# Patient Record
Sex: FEMALE | Race: WHITE | Employment: OTHER | URBAN - METROPOLITAN AREA
[De-identification: names, ages, dates, MRNs, and addresses within clinical notes are randomized per-mention and may not be internally consistent; named-entity substitution may affect disease eponyms.]

---

## 2017-02-14 DIAGNOSIS — E78.5 HYPERLIPIDEMIA LDL GOAL <100: ICD-10-CM

## 2017-02-14 DIAGNOSIS — E11.42 TYPE 2 DIABETES MELLITUS WITH DIABETIC POLYNEUROPATHY (H): ICD-10-CM

## 2017-02-14 LAB
ALBUMIN SERPL-MCNC: 3.6 G/DL (ref 3.4–5)
ALP SERPL-CCNC: 70 U/L (ref 40–150)
ALT SERPL W P-5'-P-CCNC: 35 U/L (ref 0–50)
AST SERPL W P-5'-P-CCNC: 16 U/L (ref 0–45)
BILIRUB DIRECT SERPL-MCNC: <0.1 MG/DL (ref 0–0.2)
BILIRUB SERPL-MCNC: 0.3 MG/DL (ref 0.2–1.3)
CHOLEST SERPL-MCNC: 175 MG/DL
CK SERPL-CCNC: 35 U/L (ref 30–225)
HBA1C MFR BLD: 7.3 % (ref 4.3–6)
HDLC SERPL-MCNC: 58 MG/DL
LDLC SERPL CALC-MCNC: 96 MG/DL
NONHDLC SERPL-MCNC: 117 MG/DL
PROT SERPL-MCNC: 6.9 G/DL (ref 6.8–8.8)
TRIGL SERPL-MCNC: 103 MG/DL
TSH SERPL DL<=0.005 MIU/L-ACNC: 1.69 MU/L (ref 0.4–4)

## 2017-02-14 PROCEDURE — 84443 ASSAY THYROID STIM HORMONE: CPT | Performed by: INTERNAL MEDICINE

## 2017-02-14 PROCEDURE — 80076 HEPATIC FUNCTION PANEL: CPT | Performed by: INTERNAL MEDICINE

## 2017-02-14 PROCEDURE — 82550 ASSAY OF CK (CPK): CPT | Performed by: INTERNAL MEDICINE

## 2017-02-14 PROCEDURE — 80061 LIPID PANEL: CPT | Performed by: INTERNAL MEDICINE

## 2017-02-14 PROCEDURE — 83036 HEMOGLOBIN GLYCOSYLATED A1C: CPT | Performed by: INTERNAL MEDICINE

## 2017-02-14 PROCEDURE — 36415 COLL VENOUS BLD VENIPUNCTURE: CPT | Performed by: INTERNAL MEDICINE

## 2017-02-21 ENCOUNTER — OFFICE VISIT (OUTPATIENT)
Dept: INTERNAL MEDICINE | Facility: CLINIC | Age: 82
End: 2017-02-21
Payer: COMMERCIAL

## 2017-02-21 DIAGNOSIS — R60.9 EDEMA, UNSPECIFIED TYPE: ICD-10-CM

## 2017-02-21 DIAGNOSIS — M81.0 OSTEOPOROSIS: ICD-10-CM

## 2017-02-21 DIAGNOSIS — I10 ESSENTIAL HYPERTENSION, BENIGN: ICD-10-CM

## 2017-02-21 DIAGNOSIS — E78.5 HYPERLIPIDEMIA LDL GOAL <100: ICD-10-CM

## 2017-02-21 DIAGNOSIS — Z00.01 ENCOUNTER FOR ROUTINE ADULT HEALTH EXAMINATION WITH ABNORMAL FINDINGS: Primary | ICD-10-CM

## 2017-02-21 DIAGNOSIS — E11.42 TYPE 2 DIABETES MELLITUS WITH DIABETIC POLYNEUROPATHY, WITHOUT LONG-TERM CURRENT USE OF INSULIN (H): ICD-10-CM

## 2017-02-21 PROCEDURE — 99397 PER PM REEVAL EST PAT 65+ YR: CPT | Performed by: INTERNAL MEDICINE

## 2017-02-21 PROCEDURE — 99207 C FOOT EXAM  NO CHARGE: CPT | Mod: 25 | Performed by: INTERNAL MEDICINE

## 2017-02-21 NOTE — PROGRESS NOTES
SUBJECTIVE:     CC: Tess Sellers is an 81 year old woman who presents for preventive health visit.     Healthy Habits:    Do you get at least three servings of calcium containing foods daily (dairy, green leafy vegetables, etc.)? yes    Amount of exercise or daily activities, outside of work: 4 day(s) per week    Problems taking medications regularly No    Medication side effects: No    Have you had an eye exam in the past two years? yes    Do you see a dentist twice per year? yes    Do you have sleep apnea, excessive snoring or daytime drowsiness?no            Today's PHQ-2 Score:   PHQ-2 ( 1999 [employer]) 2/21/2017 8/25/2016   Q1: Little interest or pleasure in doing things 0 0   Q2: Feeling down, depressed or hopeless 0 0   PHQ-2 Score 0 0       Abuse: Current or Past(Physical, Sexual or Emotional)- No  Do you feel safe in your environment - Yes    Social History   Substance Use Topics     Smoking status: Never Smoker     Smokeless tobacco: Never Used     Alcohol use No     The patient does not drink >3 drinks per day nor >7 drinks per week.    Recent Labs   Lab Test  02/14/17   0921  11/16/16   0925   09/14/15   0915  10/28/14   0949   CHOL  175  198   < >  171  182   HDL  58  66   < >  62  71   LDL  96  107*   < >  86  84   TRIG  103  125   < >  113  135   CHOLHDLRATIO   --    --    --   2.8  2.6   NHDL  117  132*   < >   --    --     < > = values in this interval not displayed.       Reviewed orders with patient.  Reviewed health maintenance and updated orders accordingly - Yes    Mammo Decision Support:  Patient over age 75, has elected to continue with mammography screening.    Pertinent mammograms are reviewed under the imaging tab.  History of abnormal Pap smear: NO - age 65 - see link Cervical Cytology Screening Guidelines  All Histories reviewed and updated in Epic.      ROS:  C: NEGATIVE for fever, chills, change in weight  I: NEGATIVE for worrisome rashes, moles or lesions  E: NEGATIVE for   "Irritation. Had eye exam Nov 2016. Has glasses. Some acuity change and  was told could get change Rx but has not done so yet  ENT: NEGATIVE for ear, mouth and throat problems.   R: NEGATIVE for significant cough or SOB  B: NEGATIVE for masses, tenderness or discharge  CV: NEGATIVE for chest pain, palpitations or peripheral edema  GI: NEGATIVE for nausea, abdominal pain, heartburn. Rare loose stools with certain meds. Hx partial  colectomy  : NEGATIVE for unusual urinary or vaginal symptoms. No vaginal bleeding.  M: NEGATIVE for significant arthralgias or myalgia  N: NEGATIVE for weakness, dizziness or paresthesias  P: NEGATIVE for changes in mood or affect    E: DM  and lipids as below:    Lab Results   Component Value Date     08/19/2016     Lab Results   Component Value Date    A1C 7.3 02/14/2017     Lab Results   Component Value Date    CHOL 175 02/14/2017     Lab Results   Component Value Date    LDL 96 02/14/2017     Lab Results   Component Value Date    HDL 58 02/14/2017     Lab Results   Component Value Date    TRIG 103 02/14/2017     Lab Results   Component Value Date    CR 0.81 08/19/2016     Lab Results   Component Value Date    ALT 35 02/14/2017     Lab Results   Component Value Date    AST 16 02/14/2017     Lab Results   Component Value Date    MICROL 8 08/19/2016     Lab Results   Component Value Date    TSH 1.69 02/14/2017         Problem list, Medication list, Allergies, and Medical/Social/Surgical histories reviewed in [facility] and updated as appropriate.  OBJECTIVE:     /78  Pulse 83  Temp 97.8  F (36.6  C) (Oral)  Ht 4' 11\" (1.499 m)  Wt 127 lb (57.6 kg)  LMP 10/03/1969  SpO2 98%  Breastfeeding? No  BMI 25.65 kg/m2  EXAM:  General appearance - healthy, alert, no distress  Skin - No rashes or lesions.  Head - normocephalic, atraumatic  Eyes - BRODY, EOMI, fundi exam with nondilated pupils negative.  Ears - External ears normal. Canals clear. TM's normal.  Nose/Sinuses - Nares normal. " Septum midline. Mucosa normal. No drainage or sinus tenderness.  Oropharynx - No erythema, no adenopathy, no exudates.  Neck - Supple without adenopathy or thyromegaly. No bruits.  Lungs - Clear to auscultation without wheezes/rhonchi.  Heart - Regular rate and rhythm without murmurs, clicks, or gallops.  Nodes - No supraclavicular, axillary, or inguinal adenopathy palpable.  Breasts - deferred  Abdomen - Abdomen soft, non-tender. BS normal. No masses or hepatosplenomegaly palpable. No bruits.  Extremities -No cyanosis, clubbing. Minimal BLE edema.    Musculoskeletal - Spine ROM normal. Muscular strength intact.  Left 1st MTP bunion without tenderness  Peripheral pulses - radial=4/4, femoral=4/4, posterior tibial=4/4, dorsalis pedis=4/4,  Neuro - Gait normal. Reflexes normal and symmetric. Sensation   Genital/Rectal - deferred      ASSESSMENT/PLAN:     1. Encounter for routine adult health examination with abnormal findings  HCM UTD.  Patient declines flu shots after previous reaction    2. Type 2 diabetes mellitus with diabetic polyneuropathy, without long-term current use of insulin (H)   diabetic control at goal given age. Continue current medications and repeat labs in six months  - FOOT EXAM  NO CHARGE [47093.114]  - glipiZIDE (GLUCOTROL) 5 MG tablet; Take 1 tablet (5 mg) by mouth 2 times daily (before meals)  Dispense: 180 tablet; Refill: 3  - metFORMIN (GLUCOPHAGE) 1000 MG tablet; 1 tab twice a day  Dispense: 180 tablet; Refill: 3  - blood glucose monitoring (NO BRAND SPECIFIED) test strip; Check blood sugar daily  Dispense: 100 strip; Refill: 3  - Hemoglobin A1c; Future  - Basic metabolic panel; Future  - Hemoglobin A1c; Future  - Comprehensive metabolic panel; Future  - Lipid panel reflex to direct LDL; Future  - Albumin Random Urine Quantitative; Future    3. Edema, unspecified type   stable. Continue current medication. Future labs ordered  - furosemide (LASIX) 20 MG tablet; One tablet 3 times per week  "(Monday, Wednesday, Friday)  Dispense: 90 tablet; Refill: 1  - Basic metabolic panel; Future  - Comprehensive metabolic panel; Future    4. BENIGN HYPERTENSION   stable. Continue current medication. Future labs ordered  - losartan (COZAAR) 100 MG tablet; Take 1 tablet (100 mg) by mouth daily  Dispense: 90 tablet; Refill: 3  - Comprehensive metabolic panel; Future  - Lipid panel reflex to direct LDL; Future  - Albumin Random Urine Quantitative; Future    5. Hyperlipidemia LDL goal <100   stable. Continue current medication. Future labs ordered  - lovastatin (MEVACOR) 40 MG tablet; Take 1 tablet (40 mg) by mouth At Bedtime  Dispense: 90 tablet; Refill: 3  - Comprehensive metabolic panel; Future  - Lipid panel reflex to direct LDL; Future    6. Osteoporosis   repeat  DEXA in 1 year.  Currently on drug holiday. See snapshot for details      COUNSELING:   Reviewed preventive health counseling, as reflected in patient instructions         reports that she has never smoked. She has never used smokeless tobacco.    Estimated body mass index is 25.65 kg/(m^2) as calculated from the following:    Height as of this encounter: 4' 11\" (1.499 m).    Weight as of this encounter: 127 lb (57.6 kg).       Counseling Resources:  ATP IV Guidelines  Pooled Cohorts Equation Calculator  Breast Cancer Risk Calculator  FRAX Risk Assessment  ICSI Preventive Guidelines  Dietary Guidelines for Americans, 2010  USDA's MyPlate  ASA Prophylaxis  Lung CA Screening    Venkatesh Cardenas MD  [facility]  "

## 2017-02-21 NOTE — MR AVS SNAPSHOT
After Visit Summary   2/21/2017    Tess Sellers    MRN: [id number]           Patient Information     Date Of Birth          1935        Visit Information        Provider Department      2/21/2017 11:30 AM Venkatesh Cardenas MD [facility]        Today's Diagnoses     Encounter for routine adult health examination with abnormal findings    -  1    Type 2 diabetes mellitus with diabetic polyneuropathy, without long-term current use of insulin (H)        Edema, unspecified type        BENIGN HYPERTENSION        Hyperlipidemia LDL goal <100        Osteoporosis          Care Instructions      Preventive Health Recommendations  Female Ages 65 +    Yearly exam:     See your health care provider every year in order to  o Review health changes.   o Discuss preventive care.    o Review your medicines if your doctor has prescribed any.      You no longer need a yearly Pap test unless you've had an abnormal Pap test in the past 10 years. If you have vaginal symptoms, such as bleeding or discharge, be sure to talk with your provider about a Pap test.      Every 1 to 2 years, have a mammogram.  If you are over 69, talk with your health care provider about whether or not you want to continue having screening mammograms.      Every 10 years, have a colonoscopy. Or, have a yearly FIT test (stool test). These exams will check for colon cancer.       Have a cholesterol test every 5 years, or more often if your doctor advises it.       Have a diabetes test (fasting glucose) every three years. If you are at risk for diabetes, you should have this test more often.       At age 65, have a bone density scan (DEXA) to check for osteoporosis (brittle bone disease).    Shots:    Get a flu shot each year.    Get a tetanus shot every 10 years.    Talk to your doctor about your pneumonia vaccines. There are now two you should receive - Pneumovax (PPSV 23) and Prevnar (PCV 13).    Talk to your doctor about  the shingles vaccine.    Talk to your doctor about the hepatitis B vaccine.    Nutrition:     Eat at least 5 servings of fruits and vegetables each day.      Eat whole-grain bread, whole-wheat pasta and brown rice instead of white grains and rice.      Talk to your provider about Calcium and Vitamin D.     Lifestyle    Exercise at least 150 minutes a week (30 minutes a day, 5 days a week). This will help you control your weight and prevent disease.      Limit alcohol to one drink per day.      No smoking.       Wear sunscreen to prevent skin cancer.       See your dentist twice a year for an exam and cleaning.      See your eye doctor every 1 to 2 years to screen for conditions such as glaucoma, macular degeneration, cataracts, etc         Follow-ups after your visit        Future tests that were ordered for you today     Open Future Orders        Priority Expected Expires Ordered    Hemoglobin A1c Routine 1/23/2018 2/22/2018 2/22/2017    Comprehensive metabolic panel Routine 1/23/2018 2/22/2018 2/22/2017    Lipid panel reflex to direct LDL Routine 1/23/2018 2/22/2018 2/22/2017    Albumin Random Urine Quantitative Routine 1/23/2018 2/22/2018 2/22/2017    Hemoglobin A1c Routine 8/21/2017 12/19/2017 2/22/2017    Basic metabolic panel Routine 8/21/2017 12/19/2017 2/22/2017            Who to contact     If you have questions or need follow up information about today's clinic visit or your schedule please contact [facility] directly at [phone].  Normal or non-critical lab and imaging results will be communicated to you by MyChart, letter or phone within 4 business days after the clinic has received the results. If you do not hear from us within 7 days, please contact the clinic through MyChart or phone. If you have a critical or abnormal lab result, we will notify you by phone as soon as possible.  Submit refill requests through [employer] or call your pharmacy and they will forward the refill  "request to us. Please allow 3 business days for your refill to be completed.          Additional Information About Your Visit        [employer] Information     [employer] lets you send messages to your doctor, view your test results, renew your prescriptions, schedule appointments and more. To sign up, go to [email or web address] . Click on \"Log in\" on the left side of the screen, which will take you to the Welcome page. Then click on \"Sign up Now\" on the right side of the page.     You will be asked to enter the access code listed below, as well as some personal information. Please follow the directions to create your username and password.     Your access code is: [email or web address]  Expires: 2017 10:35 PM     Your access code will  in 90 days. If you need help or a new code, please call your [place] clinic or [phone].        Care EveryWhere ID     This is your Care EveryWhere ID. This could be used by other organizations to access your [place] medical records  [id number]        Your Vitals Were     Pulse Temperature Height Last Period Pulse Oximetry Breastfeeding?    83 97.8  F (36.6  C) (Oral) 4' 11\" (1.499 m) 10/03/1969 98% No    BMI (Body Mass Index)                   25.65 kg/m2            Blood Pressure from Last 3 Encounters:   17 136/78   16 137/76   16 136/80    Weight from Last 3 Encounters:   17 127 lb (57.6 kg)   16 124 lb (56.2 kg)   16 128 lb (58.1 kg)              We Performed the Following     FOOT EXAM  NO CHARGE [23455.114]          Today's Medication Changes          These changes are accurate as of: 17 11:59 PM.  If you have any questions, ask your nurse or doctor.               These medicines have changed or have updated prescriptions.        Dose/Directions    blood glucose monitoring test strip   Commonly known as:  no brand specified   This may have changed:  additional instructions   Used for:  Type 2 diabetes mellitus with diabetic " polyneuropathy, without long-term current use of insulin (H)   Changed by:  Venkatesh Cardenas MD        Check blood sugar daily   Quantity:  100 strip   Refills:  3            Where to get your medicines      These medications were sent to [id number] - [place], [place] - [address]  [address], [facility] [address]     Phone:  [phone]     blood glucose monitoring test strip    furosemide 20 MG tablet    glipiZIDE 5 MG tablet    losartan 100 MG tablet    lovastatin 40 MG tablet    metFORMIN 1000 MG tablet                Primary Care Provider Office Phone # Fax #    Venkatesh Cardenas [phone]       [facility] [address]  [facility] [address]        Thank you!     Thank you for choosing [facility]  for your care. Our goal is always to provide you with excellent care. Hearing back from our patients is one way we can continue to improve our services. Please take a few minutes to complete the written survey that you may receive in the mail after your visit with us. Thank you!             Your Updated Medication List - Protect others around you: Learn how to safely use, store and throw away your medicines at www.disposemymeds.org.          This list is accurate as of: 2/21/17 11:59 PM.  Always use your most recent med list.                   Brand Name Dispense Instructions for use    acetaminophen 325 MG tablet    TYLENOL    100 tablet    Take 2 tablets (650 mg) by mouth every 4 hours as needed for other (mild pain)       ASPIRIN NOT PRESCRIBED    INTENTIONAL    0    due to dyspepsia, reflux       blood glucose monitoring test strip    no brand specified    100 strip    Check blood sugar daily       EPINEPHrine 0.3 MG/0.3ML injection    EPIPEN 2-EFE    2 each    Inject 0.3 mLs (0.3 mg) into the muscle once as needed for anaphylaxis       furosemide 20 MG tablet    LASIX    90 tablet    One tablet 3 times per week (Monday, Wednesday, Friday)        glipiZIDE 5 MG tablet    GLUCOTROL    180 tablet    Take 1 tablet (5 mg) by mouth 2 times daily (before meals)       losartan 100 MG tablet    COZAAR    90 tablet    Take 1 tablet (100 mg) by mouth daily       lovastatin 40 MG tablet    MEVACOR    90 tablet    Take 1 tablet (40 mg) by mouth At Bedtime       metFORMIN 1000 MG tablet    GLUCOPHAGE    180 tablet    1 tab twice a day       Multi-vitamin Tabs tablet   Generic drug:  multivitamin, therapeutic with minerals     0    1 TABLET DAILY       pyridoxine 100 MG tablet    VITAMIN B-6    0    1 x daily

## 2017-02-21 NOTE — NURSING NOTE
"Chief Complaint   Patient presents with     Physical       Initial /86  Pulse 83  Temp 97.8  F (36.6  C) (Oral)  Ht 4' 11\" (1.499 m)  Wt 127 lb (57.6 kg)  LMP 10/03/1969  SpO2 98%  Breastfeeding? No  BMI 25.65 kg/m2 Estimated body mass index is 25.65 kg/(m^2) as calculated from the following:    Height as of this encounter: 4' 11\" (1.499 m).    Weight as of this encounter: 127 lb (57.6 kg).  Medication Reconciliation: complete    "

## 2017-02-21 NOTE — LETTER
[address] [address]  [phone]        January 29, 2018    Tess Sellers  [address] [facility] [address]              Dear Tess Sellers    This is to remind you that your fasting labs and a urine specimen is due.    You may call our office at [phone] to schedule an appointment.    Please disregard this notice if you have already had your labs drawn or made an appointment.        Sincerely,        Venkatesh Cardenas MD

## 2017-02-22 VITALS
HEART RATE: 83 BPM | BODY MASS INDEX: 25.6 KG/M2 | DIASTOLIC BLOOD PRESSURE: 78 MMHG | HEIGHT: 59 IN | SYSTOLIC BLOOD PRESSURE: 136 MMHG | TEMPERATURE: 97.8 F | WEIGHT: 127 LBS | OXYGEN SATURATION: 98 %

## 2017-02-22 PROBLEM — E11.42 TYPE 2 DIABETES MELLITUS WITH DIABETIC POLYNEUROPATHY, WITHOUT LONG-TERM CURRENT USE OF INSULIN (H): Status: ACTIVE | Noted: 2017-02-22

## 2017-02-22 RX ORDER — LOVASTATIN 40 MG
40 TABLET ORAL AT BEDTIME
Qty: 90 TABLET | Refills: 3 | Status: SHIPPED | OUTPATIENT
Start: 2017-02-22 | End: 2017-11-09

## 2017-02-22 RX ORDER — LOSARTAN POTASSIUM 100 MG/1
100 TABLET ORAL DAILY
Qty: 90 TABLET | Refills: 3 | Status: SHIPPED | OUTPATIENT
Start: 2017-02-22 | End: 2018-03-04

## 2017-02-22 RX ORDER — GLIPIZIDE 5 MG/1
5 TABLET ORAL
Qty: 180 TABLET | Refills: 3 | Status: SHIPPED | OUTPATIENT
Start: 2017-02-22 | End: 2018-03-27

## 2017-02-22 RX ORDER — FUROSEMIDE 20 MG
TABLET ORAL
Qty: 90 TABLET | Refills: 1 | Status: SHIPPED | OUTPATIENT
Start: 2017-02-22 | End: 2018-03-25

## 2017-07-14 ENCOUNTER — HOSPITAL ENCOUNTER (EMERGENCY)
Facility: CLINIC | Age: 82
Discharge: HOME | End: 2017-07-14
Attending: EMERGENCY MEDICINE | Admitting: EMERGENCY MEDICINE
Payer: COMMERCIAL

## 2017-07-14 ENCOUNTER — APPOINTMENT (OUTPATIENT)
Dept: ULTRASOUND IMAGING | Facility: CLINIC | Age: 82
End: 2017-07-14
Attending: EMERGENCY MEDICINE
Payer: COMMERCIAL

## 2017-07-14 ENCOUNTER — TELEPHONE (OUTPATIENT)
Dept: NURSING | Facility: CLINIC | Age: 82
End: 2017-07-14

## 2017-07-14 VITALS
TEMPERATURE: 97.8 F | RESPIRATION RATE: 16 BRPM | DIASTOLIC BLOOD PRESSURE: 84 MMHG | OXYGEN SATURATION: 97 % | HEART RATE: 93 BPM | SYSTOLIC BLOOD PRESSURE: 154 MMHG

## 2017-07-14 DIAGNOSIS — M79.662 PAIN OF LEFT LOWER LEG: ICD-10-CM

## 2017-07-14 DIAGNOSIS — M54.32 SCIATICA OF LEFT SIDE: ICD-10-CM

## 2017-07-14 LAB
BASOPHILS # BLD AUTO: 0 10E9/L (ref 0–0.2)
BASOPHILS NFR BLD AUTO: 0.2 %
DIFFERENTIAL METHOD BLD: NORMAL
EOSINOPHIL # BLD AUTO: 0.2 10E9/L (ref 0–0.7)
EOSINOPHIL NFR BLD AUTO: 3.4 %
ERYTHROCYTE [DISTWIDTH] IN BLOOD BY AUTOMATED COUNT: 12.7 % (ref 10–15)
HCT VFR BLD AUTO: 37.8 % (ref 35–47)
HGB BLD-MCNC: 13 G/DL (ref 11.7–15.7)
IMM GRANULOCYTES # BLD: 0 10E9/L (ref 0–0.4)
IMM GRANULOCYTES NFR BLD: 0.2 %
LYMPHOCYTES # BLD AUTO: 1.6 10E9/L (ref 0.8–5.3)
LYMPHOCYTES NFR BLD AUTO: 35.8 %
MCH RBC QN AUTO: 30 PG (ref 26.5–33)
MCHC RBC AUTO-ENTMCNC: 34.4 G/DL (ref 31.5–36.5)
MCV RBC AUTO: 87 FL (ref 78–100)
MONOCYTES # BLD AUTO: 0.5 10E9/L (ref 0–1.3)
MONOCYTES NFR BLD AUTO: 11.3 %
NEUTROPHILS # BLD AUTO: 2.2 10E9/L (ref 1.6–8.3)
NEUTROPHILS NFR BLD AUTO: 49.1 %
NRBC # BLD AUTO: 0 10*3/UL
NRBC BLD AUTO-RTO: 0 /100
NT-PROBNP SERPL-MCNC: 160 PG/ML (ref 0–1800)
PLATELET # BLD AUTO: 252 10E9/L (ref 150–450)
RBC # BLD AUTO: 4.33 10E12/L (ref 3.8–5.2)
WBC # BLD AUTO: 4.4 10E9/L (ref 4–11)

## 2017-07-14 PROCEDURE — 36415 COLL VENOUS BLD VENIPUNCTURE: CPT | Performed by: EMERGENCY MEDICINE

## 2017-07-14 PROCEDURE — 83880 ASSAY OF NATRIURETIC PEPTIDE: CPT | Performed by: EMERGENCY MEDICINE

## 2017-07-14 PROCEDURE — 93970 EXTREMITY STUDY: CPT

## 2017-07-14 PROCEDURE — 85025 COMPLETE CBC W/AUTO DIFF WBC: CPT | Performed by: EMERGENCY MEDICINE

## 2017-07-14 PROCEDURE — 99284 EMERGENCY DEPT VISIT MOD MDM: CPT | Mod: 25

## 2017-07-14 ASSESSMENT — ENCOUNTER SYMPTOMS
BACK PAIN: 0
MYALGIAS: 1
SHORTNESS OF BREATH: 0

## 2017-07-14 NOTE — ED AVS SNAPSHOT
[facility] Emergency Department    [address]    [facility] [address]    Phone:  [phone]    Fax:  [phone]                                       Tess Sellers   MRN: [id number]    Department:  [facility] Emergency Department   Date of Visit:  7/14/2017           Patient Information     Date Of Birth          1935        Your diagnoses for this visit were:     Pain of left lower leg     Sciatica of left side        You were seen by Austin Sagastume MD.        Discharge Instructions       Please make an appointment to follow up with your primary care provider in 4-5 days if not improving.    Use Tylenol and/or Ibuprofen for pain or discomfort          Understanding Lumbar Radiculopathy    Lumbar radiculopathy is irritation or inflammation of a nerve root in the low back. It causes symptoms that spread out from the back down one or both legs. To understand this condition, it helps to understand the parts of the spine:    Vertebrae. These are bones that stack to form the spine. The lumbar spine contains the 5 bottom vertebrae.    Disks. These are soft pads of tissue between the vertebrae. They act as shock absorbers for the spine.    Spinal canal. This is a tunnel formed within the stacked vertebrae. In the lumbar spine, nerves run through this canal.    Nerves. These branch off and leave the spinal canal, traveling out to parts of the body. As they leave the spinal canal, nerves pass through openings between the vertebrae. The nerve root is the part of the nerve that is closest to the spinal canal.    Sciatic nerve. This is a large nerve formed from several nerve roots in the low back. This nerve extends down the back of the leg to the foot.  With lumbar radiculopathy, nerve roots in the low back become irritated. This leads to pain and symptoms. The sciatic nerve is commonly involved, so the condition is often called sciatica.  What causes lumbar  radiculopathy?  Aging, injury, poor posture, extra body weight, and other issues can lead to problems in the low back. These problems may then irritate nerve roots. They include:    Damage to a disk in the lumbar spine. The damaged disk may then press on nearby nerve roots.    Degeneration from wear and tear, and aging. This can lead to narrowing (stenosis) of the openings between the vertebrae. The narrowed openings press on nerve roots as they leave the spinal canal.    Unstable spine. This is when a vertebra slips forward. It can then press on a nerve root.  Other, less common things can put pressure on nerves in the low back. These include diabetes, infection, or a tumor.  Symptoms of lumbar radiculopathy  These include:    Pain in the low back    Pain, numbness, tingling, or weakness that travels into the buttocks, hip, groin, or leg    Muscle spasms  Treatment for lumbar radiculopathy  In most cases, your healthcare provider will first try treatments that help relieve symptoms. These may include:    Prescription and over-the-counter pain medicines. These help relieve pain, swelling, and irritation.    Limits on positions and activities that increase pain. But lying in bed or avoiding all movement is only recommended for a short period of time.    Physical therapy, including exercises and stretches. This helps decrease pain and increase movement and function.    Steroid shots into the lower back. This may help relieve symptoms for a time.    Weight-loss program. If you are overweight, losing extra pounds may help relieve symptoms.  In some cases, you may need surgery to fix the underlying problem. This depends on the cause, the symptoms, and how long the pain has lasted.  Possible complications  Over time, an irritated and inflamed nerve may become damaged. This may lead to long-lasting (permanent) numbness or weakness in your legs and feet. If symptoms change suddenly or get worse, be sure to let your  healthcare provider know.  When to call your healthcare provider  Call your healthcare provider right away if you have any of these:    New pain or pain that gets worse    New or increasing weakness, tingling, or numbness in your leg or foot    Problems controlling your bladder or bowel   Date Last Reviewed: 3/10/2016    [id number] [employer]. [address] [place] [address]. All rights reserved. This information is not intended as a substitute for professional medical care. Always follow your healthcare professional's instructions.            24 Hour Appointment Hotline       To make an appointment at any [facility], call [phone] ([phone]). If you don't have a family doctor or clinic, we will help you find one. [facility] clinics are conveniently located to serve the needs of you and your family.             Review of your medicines      Our records show that you are taking the medicines listed below. If these are incorrect, please call your family doctor or clinic.        Dose / Directions Last dose taken    acetaminophen 325 MG tablet   Commonly known as:  TYLENOL   Dose:  650 mg   Quantity:  100 tablet        Take 2 tablets (650 mg) by mouth every 4 hours as needed for other (mild pain)   Refills:  0        ASPIRIN NOT PRESCRIBED   Commonly known as:  INTENTIONAL   Quantity:  0        due to dyspepsia, reflux   Refills:  0        blood glucose monitoring test strip   Commonly known as:  no brand specified   Quantity:  100 strip        Check blood sugar daily   Refills:  3        EPINEPHrine 0.3 MG/0.3ML injection 2-pack   Commonly known as:  EPIPEN 2-EFE   Dose:  0.3 mg   Quantity:  2 each        Inject 0.3 mLs (0.3 mg) into the muscle once as needed for anaphylaxis   Refills:  1        furosemide 20 MG tablet   Commonly known as:  LASIX   Quantity:  90 tablet        One tablet 3 times per week (Monday, Wednesday, Friday)   Refills:  1        glipiZIDE 5 MG tablet    Commonly known as:  GLUCOTROL   Dose:  5 mg   Quantity:  180 tablet        Take 1 tablet (5 mg) by mouth 2 times daily (before meals)   Refills:  3        losartan 100 MG tablet   Commonly known as:  COZAAR   Dose:  100 mg   Quantity:  90 tablet        Take 1 tablet (100 mg) by mouth daily   Refills:  3        lovastatin 40 MG tablet   Commonly known as:  MEVACOR   Dose:  40 mg   Quantity:  90 tablet        Take 1 tablet (40 mg) by mouth At Bedtime   Refills:  3        metFORMIN 1000 MG tablet   Commonly known as:  GLUCOPHAGE   Quantity:  180 tablet        1 tab twice a day   Refills:  3        Multi-vitamin Tabs tablet   Quantity:  0   Generic drug:  multivitamin, therapeutic with minerals        1 TABLET DAILY   Refills:  0        pyridoxine 100 MG tablet   Commonly known as:  VITAMIN B-6   Quantity:  0        1 x daily   Refills:  0                Procedures and tests performed during your visit     BNP    CBC + differential    US Lower Extremity Venous Duplex Bilateral      Orders Needing Specimen Collection     None      Pending Results     No orders found from 7/12/2017 to 7/15/2017.            Pending Culture Results     No orders found from 7/12/2017 to 7/15/2017.            Pending Results Instructions     If you had any lab results that were not finalized at the time of your Discharge, you can call the ED Lab Result RN at [phone]. You will be contacted by this team for any positive Lab results or changes in treatment. The nurses are available 7 days a week from 10A to 6:30P.  You can leave a message 24 hours per day and they will return your call.        Test Results From Your Hospital Stay        7/14/2017  5:06 PM      Component Results     Component Value Ref Range & Units Status    N-Terminal Pro BNP Inpatient 160 0 - 1800 pg/mL Final    Reference range shown and results flagged as abnormal are suggested inpatient   cut points for confirming diagnosis if CHF in an acute setting. Establishing    a   baseline value for each individual patient is useful for follow-up. An   inpatient or emergency department NT-proPBNP <300 pg/mL effectively rules out   acute CHF, with 99% negative predictive value.  The outpatient non-acute reference range for ruling out CHF is:   0-125 pg/mL (age 18 to less than 75)   0-450 pg/mL (age 75 yrs and older)           7/14/2017  4:46 PM      Component Results     Component Value Ref Range & Units Status    WBC 4.4 4.0 - 11.0 10e9/L Final    RBC Count 4.33 3.8 - 5.2 10e12/L Final    Hemoglobin 13.0 11.7 - 15.7 g/dL Final    Hematocrit 37.8 35.0 - 47.0 % Final    MCV 87 78 - 100 fl Final    MCH 30.0 26.5 - 33.0 pg Final    MCHC 34.4 31.5 - 36.5 g/dL Final    RDW 12.7 10.0 - 15.0 % Final    Platelet Count 252 150 - 450 10e9/L Final    Diff Method Automated Method  Final    % Neutrophils 49.1 % Final    % Lymphocytes 35.8 % Final    % Monocytes 11.3 % Final    % Eosinophils 3.4 % Final    % Basophils 0.2 % Final    % Immature Granulocytes 0.2 % Final    Nucleated RBCs 0 0 /100 Final    Absolute Neutrophil 2.2 1.6 - 8.3 10e9/L Final    Absolute Lymphocytes 1.6 0.8 - 5.3 10e9/L Final    Absolute Monocytes 0.5 0.0 - 1.3 10e9/L Final    Absolute Eosinophils 0.2 0.0 - 0.7 10e9/L Final    Absolute Basophils 0.0 0.0 - 0.2 10e9/L Final    Abs Immature Granulocytes 0.0 0 - 0.4 10e9/L Final    Absolute Nucleated RBC 0.0  Final         7/14/2017  5:43 PM      Narrative     BILATERAL LOWER EXTREMITY VENOUS DOPPLER ULTRASOUND  7/14/2017 5:41 PM    HISTORY: Bilateral lower extremity pain and swelling. The concern is  for deep venous thrombosis.    COMPARISON: None.    FINDINGS: Color flow and Doppler spectral waveform analysis  demonstrates normal blood flow in the common femoral, femoral,  popliteal, posterior tibial, and greater saphenous veins of the lower  extremities bilaterally. No thrombus is seen.        Impression     IMPRESSION: There is no evidence of deep venous thrombosis within  the  lower extremities bilaterally.    JANESSA FLETCHER MD                Clinical Quality Measure: Blood Pressure Screening     Your blood pressure was checked while you were in the emergency department today. The last reading we obtained was  BP: 154/84 . Please read the guidelines below about what these numbers mean and what you should do about them.  If your systolic blood pressure (the top number) is less than 120 and your diastolic blood pressure (the bottom number) is less than 80, then your blood pressure is normal. There is nothing more that you need to do about it.  If your systolic blood pressure (the top number) is 120-139 or your diastolic blood pressure (the bottom number) is 80-89, your blood pressure may be higher than it should be. You should have your blood pressure rechecked within a year by a primary care provider.  If your systolic blood pressure (the top number) is 140 or greater or your diastolic blood pressure (the bottom number) is 90 or greater, you may have high blood pressure. High blood pressure is treatable, but if left untreated over time it can put you at risk for heart attack, stroke, or kidney failure. You should have your blood pressure rechecked by a primary care provider within the next 4 weeks.  If your provider in the emergency department today gave you specific instructions to follow-up with your doctor or provider even sooner than that, you should follow that instruction and not wait for up to 4 weeks for your follow-up visit.        Thank you for choosing [facility]       Thank you for choosing [facility] for your care. Our goal is always to provide you with excellent care. Hearing back from our patients is one way we can continue to improve our services. Please take a few minutes to complete the written survey that you may receive in the mail after you visit with us. Thank you!        [employer] Information     [employer] lets you send messages to your doctor, view your test  "results, renew your prescriptions, schedule appointments and more. To sign up, go to [email or web address] . Click on \"Log in\" on the left side of the screen, which will take you to the Welcome page. Then click on \"Sign up Now\" on the right side of the page.     You will be asked to enter the access code listed below, as well as some personal information. Please follow the directions to create your username and password.     Your access code is: [email or web address]  Expires: 10/12/2017  6:05 PM     Your access code will  in 90 days. If you need help or a new code, please call your [facility] clinic or [phone].        Care EveryWhere ID     This is your Care EveryWhere ID. This could be used by other organizations to access your [facility] medical records  [id number]        Equal Access to Services     RICKY MILLS : Ernie De La O, natalia stevens, jose gaona, yudi garcia . So [facility] [phone].    ATENCIÓN: Si habla español, tiene a hughes disposición servicios gratuitos de asistencia lingüística. Llame al [phone].    We comply with applicable federal civil rights laws and [place] laws. We do not discriminate on the basis of race, color, national origin, age, disability sex, sexual orientation or gender identity.            After Visit Summary       This is your record. Keep this with you and show to your community pharmacist(s) and doctor(s) at your next visit.                  "

## 2017-07-14 NOTE — DISCHARGE INSTRUCTIONS
Please make an appointment to follow up with your primary care provider in 4-5 days if not improving.    Use Tylenol and/or Ibuprofen for pain or discomfort          Understanding Lumbar Radiculopathy    Lumbar radiculopathy is irritation or inflammation of a nerve root in the low back. It causes symptoms that spread out from the back down one or both legs. To understand this condition, it helps to understand the parts of the spine:    Vertebrae. These are bones that stack to form the spine. The lumbar spine contains the 5 bottom vertebrae.    Disks. These are soft pads of tissue between the vertebrae. They act as shock absorbers for the spine.    Spinal canal. This is a tunnel formed within the stacked vertebrae. In the lumbar spine, nerves run through this canal.    Nerves. These branch off and leave the spinal canal, traveling out to parts of the body. As they leave the spinal canal, nerves pass through openings between the vertebrae. The nerve root is the part of the nerve that is closest to the spinal canal.    Sciatic nerve. This is a large nerve formed from several nerve roots in the low back. This nerve extends down the back of the leg to the foot.  With lumbar radiculopathy, nerve roots in the low back become irritated. This leads to pain and symptoms. The sciatic nerve is commonly involved, so the condition is often called sciatica.  What causes lumbar radiculopathy?  Aging, injury, poor posture, extra body weight, and other issues can lead to problems in the low back. These problems may then irritate nerve roots. They include:    Damage to a disk in the lumbar spine. The damaged disk may then press on nearby nerve roots.    Degeneration from wear and tear, and aging. This can lead to narrowing (stenosis) of the openings between the vertebrae. The narrowed openings press on nerve roots as they leave the spinal canal.    Unstable spine. This is when a vertebra slips forward. It can then press on a nerve  root.  Other, less common things can put pressure on nerves in the low back. These include diabetes, infection, or a tumor.  Symptoms of lumbar radiculopathy  These include:    Pain in the low back    Pain, numbness, tingling, or weakness that travels into the buttocks, hip, groin, or leg    Muscle spasms  Treatment for lumbar radiculopathy  In most cases, your healthcare provider will first try treatments that help relieve symptoms. These may include:    Prescription and over-the-counter pain medicines. These help relieve pain, swelling, and irritation.    Limits on positions and activities that increase pain. But lying in bed or avoiding all movement is only recommended for a short period of time.    Physical therapy, including exercises and stretches. This helps decrease pain and increase movement and function.    Steroid shots into the lower back. This may help relieve symptoms for a time.    Weight-loss program. If you are overweight, losing extra pounds may help relieve symptoms.  In some cases, you may need surgery to fix the underlying problem. This depends on the cause, the symptoms, and how long the pain has lasted.  Possible complications  Over time, an irritated and inflamed nerve may become damaged. This may lead to long-lasting (permanent) numbness or weakness in your legs and feet. If symptoms change suddenly or get worse, be sure to let your healthcare provider know.  When to call your healthcare provider  Call your healthcare provider right away if you have any of these:    New pain or pain that gets worse    New or increasing weakness, tingling, or numbness in your leg or foot    Problems controlling your bladder or bowel   Date Last Reviewed: 3/10/2016    4292-9547 [employer]. [address] [place] [address], [place] [address]. All rights reserved. This information is not intended as a substitute for professional medical care. Always follow your healthcare professional's  instructions.

## 2017-07-14 NOTE — ED AVS SNAPSHOT
[facility] Emergency Department    [address]    [facility] [address]    Phone:  [phone]    Fax:  [phone]                                       Tess Sellers   MRN: [id number]    Department:  [facility] Emergency Department   Date of Visit:  7/14/2017           After Visit Summary Signature Page     I have received my discharge instructions, and my questions have been answered. I have discussed any challenges I see with this plan with the nurse or doctor.    ..........................................................................................................................................  Patient/Patient Representative Signature      ..........................................................................................................................................  Patient Representative Print Name and Relationship to Patient    ..................................................               ................................................  Date                                            Time    ..........................................................................................................................................  Reviewed by Signature/Title    ...................................................              ..............................................  Date                                                            Time

## 2017-07-14 NOTE — TELEPHONE ENCOUNTER
"Tess Sellers is a 82 year old female who calls with L) leg pain.    NURSING ASSESSMENT:  Description:  L) \"terrible\" sharp/throbbing leg pain.  Pain at knee down to foot.    Onset/duration:  Onset - 4 days ago   Duration - constant   Precip. factors:  No recent injury or trauma   Associated symptoms:  L) leg swelling.  Discoloration - purple spot on L) anterior foot, onset one week ago.  Denies CP, SOB, fever, warmth, or redness.    Improves/worsens symptoms:  Improves with Tylenol .  Worsens with weight bearing and ambulation.     Pain scale (0-10)   10/10    Allergies:   Allergies   Allergen Reactions     Atenolol      fatigue     Atorvastatin Calcium      myalgias, fatigue     Captopril      cough     Clonidine      dry mouth     Diltiazem Hcl      Influenza Virus Vaccine H5n1      local reaction     Lisinopril      cough     Methyldopa      Naproxen      Norvasc [Amlodipine Besylate]      edema       Pravachol [Pravastatin Sodium]      myalgias     Spironolactone      Terazosin Hcl      edema, fatigue     Thiazide-Type Diuretics      Verapamil Hcl      dizzy       NURSING PLAN: Nursing advice to patient to seek emergency care.     RECOMMENDED DISPOSITION:  To ED, another person to drive - patient stated son will drive her to ER.  Will comply with recommendation: Yes  If further questions/concerns or if symptoms do not improve, worsen or new symptoms develop, call your PCP or [facility] as soon as possible.      Guideline used:  Telephone Triage Protocols for Nurses, Fifth Edition, Radha Alexander RN    "

## 2017-07-14 NOTE — ED NOTES
Blood sugar of 184 this morning at home. Patient does not use insulin. Notes this is a higher than normal blood sugar for her but she has been compliant with her medications. Patient is concerned for blood clot in the affected leg.

## 2017-07-14 NOTE — ED NOTES
Left leg pain for four days. Bilateral ankle swelling. Pt notes tingling to her feet, but states that it is from diabetes. Patient took a pain pill prior to arrival and denies any current pain. Denies chest pain or SOB. Denies recent injury or fall. ABCDs intact.

## 2017-07-14 NOTE — ED PROVIDER NOTES
"  History     Chief Complaint:  Left Leg Pain      HPI   Tess Sellers is a 82 year old female who presents to the emergency department today for evaluation of left leg pain. The patient reports intermittent pain that starts at the back of her left calf and radiates up her leg to her hip and butt when it is at its most painful. She denies any pain in her foot or ankle. She describes the pain as so severe that it \"goes into her bones\". The patient is a diabetic but is not on insulin and had a sugar of 184 this morning. She has leg swelling but this is not new. The patient denies back pain, shortness of breath, sciatic nerve issues, recent travel or history of DVT.    PE/DVT Risk Factors:   Hx of PE/DVT:                        Negative  Hx of clotting disorder:            Negative  Hx of cancer:                          Negative  Tobacco use:                          Negative  Hormone therapy:                   Negative  Pregnancy:                              Negative  Prolonged immobilization:       Negative  Recent surgery:                       Negative  Recent travel:                          Negative  Familial Hx of PE/DVT:           Negative    Allergies:  Atenolol  Atorvastatin Calcium  Captopril  Clonidine  Diltiazem Hcl  Influenza Virus Vaccine H5n1  Lisinopril  Methyldopa  Naproxen  Norvasc [Amlodipine Besylate]  Pravachol [Pravastatin Sodium]  Spironolactone  Terazosin Hcl  Thiazide-Type Diuretics  Verapamil Hcl      Medications:    furosemide (LASIX) 20 MG tablet  losartan (COZAAR) 100 MG tablet  glipiZIDE (GLUCOTROL) 5 MG tablet  metFORMIN (GLUCOPHAGE) 1000 MG tablet  blood glucose monitoring (NO BRAND SPECIFIED) test strip  lovastatin (MEVACOR) 40 MG tablet  EPINEPHrine (EPIPEN 2-EFE) 0.3 MG/0.3ML injection    Past Medical History:    Colon polyps   Diaphragmatic hernia without mention of obstruction or gangrene   DYSPEPSIA   Edema   Esophageal reflux   Essential hypertension, benign   Hyperlipidemia " LDL goal <100   Hypertrophy of breast   idiopathic cyclic edema   Mixed incontinence   Nonspecific reaction to tuberculin skin test without active tuberculosis   Osteoporosis, unspecified   Peripheral neuropathy (H)   Spinal stenosis, lumbar region, without neurogenic claudication   Type 2 diabetes mellitus with diabetic polyneuropathy (goal A1C<8)     Past Surgical History:    Hysterectomy  Status Post Hysterectomy  Appendectomy  Cholecystectomy  Colon polyps  Bilateral Breast Reduction  Right cholectomy  Hemorrhoidectomy  IOL OS  Blepharoplasty  Laser OS  DaVinci Sacrocolpoplexy, cystoscopy combined  Herniorrhaphy    Family History:    Colorectal cancer  Heart Disease  Lung cancer    Social History:  The patient was accompanied to the ED by her daughter.  Smoking Status: Never Smoker  Smokeless Tobacco: Never Used  Alcohol Use: No   Marital Status:  Single [1]    Review of Systems   Respiratory: Negative for shortness of breath.    Cardiovascular: Positive for leg swelling (baseline).   Musculoskeletal: Positive for myalgias (left calf, leg, butt). Negative for back pain. Joint swelling: left calf, thigh, butt.        Denies foot and ankle pain   All other systems reviewed and are negative.    Physical Exam   First Vitals:  BP: (!) 192/121  Pulse: 93  Temp: 97.8  F (36.6  C)  Resp: 16  SpO2: 96 %    Physical Exam    HEENT: mmm  Neck: supple  CV: ppi, regular   Resp: speaking in full sentences with any resp distress, ctab  Abd: soft,nt, nd  Back: No TTP midline C/T/L spine, no Paraspinous muscle TTP  Skin: warm, dry, well perused, no rashes/bruising/lesions on exposed skin. Mild bilateral pedal edema  Neuro: alert, follows commands, speech clear, strength 5/5 and symmetric, SILT in BUE  BLE   5/5 Strength Thigh Flex/Ext, Leg flex/Ext, PF/DF/EHL   SILT all 5 dermatomes BLE   Normal muscular tone            Positive straight leg raise on the left both passive and active in about 60 . This exactly reproduces her  symptoms   Gait stable    Psych: nomral mood and affect    Emergency Department Course     Imaging:  Radiology findings were communicated with the patient who voiced understanding of the findings.    US Lower Extremity Venous Duplex Bilateral  IMPRESSION: There is no evidence of deep venous thrombosis within the  lower extremities bilaterally.  Report per radiology      Laboratory:  Laboratory findings were communicated with the patient who voiced understanding of the findings.  CBC: AWNL. (WBC 4.4, HGB 13.0, )   BNP: 160     Emergency Department Course:  Nursing notes and vitals reviewed.  I performed an exam of the patient as documented above.   The patient was sent for a US Lower Extremity Venous Duplex Bilateral while in the emergency department, results above.   IV was inserted and blood was drawn for laboratory testing, results above.   1757: Patient rechecked and updated.    I discussed the treatment plan with the patient. They expressed understanding of this plan and consented to discharge. They will be discharged home with instructions for care and follow up. In addition, the patient will return to the emergency department if their symptoms persist, worsen, if new symptoms arise or if there is any concern.  All questions were answered.   I personally reviewed the laboratory and imaging results with the Patient and answered all related questions prior to discharge.    Impression & Plan      Medical Decision Making:  Tess Sellers is a 82 year old female here with intermittent left leg pain for the past 4 or 5 days. She has baseline bilateral lower extremity swelling. Work up here was unremarkable for DVT. There is no evidence of soft tissue cellulitis here. She does have pain reproducible with straight leg raise. She has no motor or sensory deficits. I suspect that this is more consistent with sciatica and radiculopathy. I do not think that this is consistent with claudication given her strong  pulses, no lack of perfusion, no pattern of exertional symptoms which would be classic for claudication.    Diagnosis:    ICD-10-CM    1. Pain of left lower leg M79.662    2. Sciatica of left side M54.32       Disposition:   Discharged to home     Scribe Disclosure:  I, Natalie Michael, am serving as a scribe at 4:18 PM on 7/14/2017 to document services personally performed by Austin Sagastume MD, based on my observations and the provider's statements to me.    7/14/2017   [facility] EMERGENCY DEPARTMENT       Austin Sagastume MD  07/14/17 7073

## 2017-07-17 ENCOUNTER — TELEPHONE (OUTPATIENT)
Dept: INTERNAL MEDICINE | Facility: CLINIC | Age: 82
End: 2017-07-17

## 2017-07-17 NOTE — TELEPHONE ENCOUNTER
Reason for call:  Same Day Appointment   Requested Provider: Dr. Cardenas    PCP: [unfilled]Dr. Cardenas    Reason for visit: ER follow up sciatic nerve      Have you been treated for this in the past? No    Additional comments: Patient is in a lot of pain and would like to be seen sooner then Friday      Phone number to reach patient:  Home number on file [phone] (home)    Best Time:  anytime    Can we leave a detailed message on this number?  YES

## 2017-07-18 ENCOUNTER — OFFICE VISIT (OUTPATIENT)
Dept: INTERNAL MEDICINE | Facility: CLINIC | Age: 82
End: 2017-07-18
Payer: COMMERCIAL

## 2017-07-18 VITALS
SYSTOLIC BLOOD PRESSURE: 134 MMHG | OXYGEN SATURATION: 97 % | WEIGHT: 125 LBS | HEART RATE: 83 BPM | DIASTOLIC BLOOD PRESSURE: 78 MMHG | TEMPERATURE: 98.2 F | BODY MASS INDEX: 25.25 KG/M2

## 2017-07-18 DIAGNOSIS — Z71.89 ADVANCED DIRECTIVES, COUNSELING/DISCUSSION: ICD-10-CM

## 2017-07-18 DIAGNOSIS — M54.16 LUMBAR RADICULOPATHY: Primary | ICD-10-CM

## 2017-07-18 PROCEDURE — 99214 OFFICE O/P EST MOD 30 MIN: CPT | Performed by: INTERNAL MEDICINE

## 2017-07-18 RX ORDER — GABAPENTIN 100 MG/1
CAPSULE ORAL
Qty: 90 CAPSULE | Refills: 11 | Status: SHIPPED | OUTPATIENT
Start: 2017-07-18 | End: 2018-08-05

## 2017-07-18 NOTE — NURSING NOTE
"Chief Complaint   Patient presents with     Hospital F/U       Initial /78  Pulse 83  Temp 98.2  F (36.8  C) (Oral)  Wt 125 lb (56.7 kg)  LMP 10/03/1969  SpO2 97%  BMI 25.25 kg/m2 Estimated body mass index is 25.25 kg/(m^2) as calculated from the following:    Height as of 2/21/17: 4' 11\" (1.499 m).    Weight as of this encounter: 125 lb (56.7 kg).  Medication Reconciliation: complete    "

## 2017-07-18 NOTE — PROGRESS NOTES
"  SUBJECTIVE:                                                    Tess Sellers is a 82 year old female who presents to clinic today for the following health issues:      ED/UC Followup:    Facility:  [facility]  Date of visit: 07/14/2017  Reason for visit: Sciatica of the left side  Current Status: still in Pain     Pt's past medical history, family history, habits, medications and allergies were reviewed with the patient today.  See snap shot for  HCM status. Most recent lab results reviewed with pt. Problem list and histories reviewed & adjusted, as indicated.  Additional history as below:     Recent emergency room note reviewed regarding left leg pains. Negative venous Doppler.Previous lumbar MRI from 2009 reviewed in the chart which showed moderate to severe central stenosis at L4 -5 with some spondylolisthesis. Patient has mild  Low back pain but most of her symptoms involve pain within the calf and thigh of the left leg and buttock which worsen at night when sleeping but also present some during the day. Not increased with ambulation versus at rest. Patient denies weakness in the leg. No new bowel or bladder incontinence.  Patient states extra strength Tylenol 1000 mg will improve her pain symptoms for about four hours. Pain is rated as moderate in severity when present.  Symptoms have been present for quite awhile.      Additional ROS:   Constitutional, HEENT, Cardiovascular, Pulmonary, GI and , Neuro, MSK and Psych review of systems/symptoms are otherwise negative or unchanged from previous, except as noted above.      OBJECTIVE:  /78  Pulse 83  Temp 98.2  F (36.8  C) (Oral)  Wt 125 lb (56.7 kg)  LMP 10/03/1969  SpO2 97%  BMI 25.25 kg/m2   Estimated body mass index is 25.25 kg/(m^2) as calculated from the following:    Height as of 2/21/17: 4' 11\" (1.499 m).    Weight as of this encounter: 125 lb (56.7 kg).  Eye: PERRL, EOMI  HENT: ear canals and TM's normal and nose and mouth without " ulcers or lesions   Neck: no adenopathy. Thyroid normal to palpation. No bruits  Pulm: Lungs clear to auscultation   CV: Regular rates and rhythm  GI: Soft, nontender, Normal active bowel sounds, No hepatosplenomegaly or masses palpable  Ext: Peripheral pulses intact. No edema.  Neuro: Normal strength and tone, sensory exam grossly normal  Back: Tenderness to palpation left paralumbar area. Neg SLRT right and equivocal left       Assessment/Plan: (See plan discussion below for further details)  1. Lumbar radiculopathy  See plan below  - gabapentin (NEURONTIN) 100 MG capsule; 1-3 capsules at bedtime  Dispense: 90 capsule; Refill: 11  - MR Lumbar Spine w/o Contrast; Future    2. Advanced directives, counseling/discussion  Pt/daughter here today given form for pt to complete re: AD and tele number for class if wishes to attend    Plan discussion:  Gabapentin 100mg capsule, 1 capsule at bedtime for pain. If no side effects with med and  Pain persists after 2 days, then increased to 2 capsules at bedtime. If no improvement after 2-3 days that dose, then increase to 3 capsules at bedtime  Call update re: pain, dose used in 1 week [phone]  MRI lumbar spine  See me  In the week after the MRI is done to review results and treatment options       Venkatesh Cardenas MD  Internal Medicine Department  [facility]

## 2017-07-18 NOTE — MR AVS SNAPSHOT
After Visit Summary   7/18/2017    Tess Sellers    MRN: [id number]           Patient Information     Date Of Birth          1935        Visit Information        Provider Department      7/18/2017 4:30 PM Venkatesh Cardenas MD [facility]        Today's Diagnoses     Lumbar radiculopathy    -  1    Advanced directives, counseling/discussion          Care Instructions    Gabapentin 100mg capsule, 1 capsule at bedtime for pain. If no side effects with med and  Pain persists after 2 days, then increased to 2 capsules at bedtime. If no improvement after 2-3 days that dose, then increase to 3 capsules at bedtime  Call update re: pain, dose used in 1 week [phone]  MRI lumbar spine  See me  In the week after the MRI is done to review results and treatment options          Follow-ups after your visit        Future tests that were ordered for you today     Open Future Orders        Priority Expected Expires Ordered    MR Lumbar Spine w/o Contrast Routine  7/18/2018 7/18/2017            Who to contact     If you have questions or need follow up information about today's clinic visit or your schedule please contact [facility] directly at [phone].  Normal or non-critical lab and imaging results will be communicated to you by MyChart, letter or phone within 4 business days after the clinic has received the results. If you do not hear from us within 7 days, please contact the clinic through MyChart or phone. If you have a critical or abnormal lab result, we will notify you by phone as soon as possible.  Submit refill requests through [employer] or call your pharmacy and they will forward the refill request to us. Please allow 3 business days for your refill to be completed.          Additional Information About Your Visit        MyChart Information     [employer] lets you send messages to your doctor, view your test results, renew your prescriptions, schedule  "appointments and more. To sign up, go to [email or web address] . Click on \"Log in\" on the left side of the screen, which will take you to the Welcome page. Then click on \"Sign up Now\" on the right side of the page.     You will be asked to enter the access code listed below, as well as some personal information. Please follow the directions to create your username and password.     Your access code is: [email or web address]  Expires: 10/12/2017  6:05 PM     Your access code will  in 90 days. If you need help or a new code, please call your [place] clinic or [phone].        Care EveryWhere ID     This is your Care EveryWhere ID. This could be used by other organizations to access your [place] medical records  [id number]        Your Vitals Were     Pulse Temperature Last Period Pulse Oximetry BMI (Body Mass Index)       83 98.2  F (36.8  C) (Oral) 10/03/1969 97% 25.25 kg/m2        Blood Pressure from Last 3 Encounters:   17 134/78   17 154/84   17 136/78    Weight from Last 3 Encounters:   17 125 lb (56.7 kg)   17 127 lb (57.6 kg)   16 124 lb (56.2 kg)                 Today's Medication Changes          These changes are accurate as of: 17  5:21 PM.  If you have any questions, ask your nurse or doctor.               Start taking these medicines.        Dose/Directions    gabapentin 100 MG capsule   Commonly known as:  NEURONTIN   Used for:  Lumbar radiculopathy   Started by:  Venkatesh Cardenas MD        1-3 capsules at bedtime   Quantity:  90 capsule   Refills:  11            Where to get your medicines      These medications were sent to [id number] - [place], [place] - [address]  [address] [place] [address]. [address] [address]     Phone:  [phone]     gabapentin 100 MG capsule                Primary Care Provider Office Phone # Fax #    Venkatesh Cardenas [phone]       [facility] [address] [place] [address]        Equal " Access to Services     [facility] : Hadii aad ku hadherieverton Lianeali, wamarcoda luqadaha, qaybta kashankarkizzy gaona, yudi lockhart. So [facility] [phone].    ATENCIÓN: Si habla cassandra, tiene a hughes disposición servicios gratuitos de asistencia lingüística. Llame al [phone].    We comply with applicable federal civil rights laws and [place] laws. We do not discriminate on the basis of race, color, national origin, age, disability sex, sexual orientation or gender identity.            Thank you!     Thank you for choosing [facility]  for your care. Our goal is always to provide you with excellent care. Hearing back from our patients is one way we can continue to improve our services. Please take a few minutes to complete the written survey that you may receive in the mail after your visit with us. Thank you!             Your Updated Medication List - Protect others around you: Learn how to safely use, store and throw away your medicines at www.disposemymeds.org.          This list is accurate as of: 7/18/17  5:21 PM.  Always use your most recent med list.                   Brand Name Dispense Instructions for use Diagnosis    acetaminophen 325 MG tablet    TYLENOL    100 tablet    Take 2 tablets (650 mg) by mouth every 4 hours as needed for other (mild pain)    Post-operative state       ASPIRIN NOT PRESCRIBED    INTENTIONAL    0    due to dyspepsia, reflux    Type II or unspecified type diabetes mellitus without mention of complication, not stated as uncontrolled       blood glucose monitoring test strip    no brand specified    100 strip    Check blood sugar daily    Type 2 diabetes mellitus with diabetic polyneuropathy, without long-term current use of insulin (H)       EPINEPHrine 0.3 MG/0.3ML injection 2-pack    EPIPEN 2-EFE    2 each    Inject 0.3 mLs (0.3 mg) into the muscle once as needed for anaphylaxis    Bee sting reaction       furosemide 20 MG tablet     LASIX    90 tablet    One tablet 3 times per week (Monday, Wednesday, Friday)    Edema, unspecified type       gabapentin 100 MG capsule    NEURONTIN    90 capsule    1-3 capsules at bedtime    Lumbar radiculopathy       glipiZIDE 5 MG tablet    GLUCOTROL    180 tablet    Take 1 tablet (5 mg) by mouth 2 times daily (before meals)    Type 2 diabetes mellitus with diabetic polyneuropathy, without long-term current use of insulin (H)       losartan 100 MG tablet    COZAAR    90 tablet    Take 1 tablet (100 mg) by mouth daily    Essential hypertension, benign       lovastatin 40 MG tablet    MEVACOR    90 tablet    Take 1 tablet (40 mg) by mouth At Bedtime    Hyperlipidemia LDL goal <100       metFORMIN 1000 MG tablet    GLUCOPHAGE    180 tablet    1 tab twice a day    Type 2 diabetes mellitus with diabetic polyneuropathy, without long-term current use of insulin (H)       Multi-vitamin Tabs tablet   Generic drug:  multivitamin, therapeutic with minerals     0    1 TABLET DAILY    Osteoporosis, unspecified       pyridoxine 100 MG tablet    VITAMIN B-6    0    1 x daily    Breast tenderness

## 2017-07-18 NOTE — PATIENT INSTRUCTIONS
Gabapentin 100mg capsule, 1 capsule at bedtime for pain. If no side effects with med and  Pain persists after 2 days, then increased to 2 capsules at bedtime. If no improvement after 2-3 days that dose, then increase to 3 capsules at bedtime  Call update re: pain, dose used in 1 week [phone]  MRI lumbar spine  See me  In the week after the MRI is done to review results and treatment options

## 2017-07-19 ENCOUNTER — HOSPITAL ENCOUNTER (OUTPATIENT)
Dept: MRI IMAGING | Facility: CLINIC | Age: 82
Discharge: HOME | End: 2017-07-19
Attending: INTERNAL MEDICINE | Admitting: INTERNAL MEDICINE
Payer: COMMERCIAL

## 2017-07-19 DIAGNOSIS — M54.16 LUMBAR RADICULOPATHY: ICD-10-CM

## 2017-07-19 PROCEDURE — 72148 MRI LUMBAR SPINE W/O DYE: CPT

## 2017-07-21 ENCOUNTER — TELEPHONE (OUTPATIENT)
Dept: INTERNAL MEDICINE | Facility: CLINIC | Age: 82
End: 2017-07-21

## 2017-07-21 NOTE — TELEPHONE ENCOUNTER
Reason for call:  Other   Patient called regarding (reason for call):Patient was calling to see what time Dr. Cardenas wanted to see her. She said she was supposed to see him 2-3 days after she had an MRI on WED 07/19/2017.    Additional comments: I tried to make an appointment for her but she wants to know when Dr. Cardenas wants to see her. Please call      Phone number to reach patient:  Home number on file [phone] (home)    Best Time:  anytime    Can we leave a detailed message on this number?  YES

## 2017-07-21 NOTE — TELEPHONE ENCOUNTER
Pt to see me Monday 7/24/17  at either 11:00 or 4:30 in same day appt slot to discuss MRI results in detail and treatment options. Please schedule and inform pt

## 2017-07-24 ENCOUNTER — OFFICE VISIT (OUTPATIENT)
Dept: INTERNAL MEDICINE | Facility: CLINIC | Age: 82
End: 2017-07-24
Payer: COMMERCIAL

## 2017-07-24 VITALS
BODY MASS INDEX: 25.45 KG/M2 | OXYGEN SATURATION: 97 % | SYSTOLIC BLOOD PRESSURE: 130 MMHG | HEART RATE: 90 BPM | WEIGHT: 126 LBS | TEMPERATURE: 98.4 F | DIASTOLIC BLOOD PRESSURE: 72 MMHG

## 2017-07-24 DIAGNOSIS — M54.16 CHRONIC LEFT LUMBAR RADICULOPATHY: Primary | ICD-10-CM

## 2017-07-24 PROCEDURE — 99214 OFFICE O/P EST MOD 30 MIN: CPT | Performed by: INTERNAL MEDICINE

## 2017-07-24 NOTE — NURSING NOTE
"Chief Complaint   Patient presents with     Results       Initial /72  Pulse 90  Temp 98.4  F (36.9  C) (Oral)  Wt 126 lb (57.2 kg)  LMP 10/03/1969  SpO2 97%  BMI 25.45 kg/m2 Estimated body mass index is 25.45 kg/(m^2) as calculated from the following:    Height as of 2/21/17: 4' 11\" (1.499 m).    Weight as of this encounter: 126 lb (57.2 kg).  Medication Reconciliation: complete  "

## 2017-07-24 NOTE — PATIENT INSTRUCTIONS
Increase Gabapentin to 3 capsules (total 300mg)  daily at 9 PM  If side effects, then call nurseline tomorrow [phone]. Otherwise call uddate Friday if improved  If not quite improved, will order epidural steroid injection at [facility]  Possible future physical therapy vs additional pain medication at bedtime

## 2017-07-24 NOTE — PROGRESS NOTES
SUBJECTIVE:                                                    Tess Sellers is a 82 year old female who presents to clinic today for the following health issues:      Review MRI Results/ Patient wants to discuss Gabapentin      Problems taking medications regularly: No    Medication side effects: none    Diet: regular (no restrictions)    Pt's past medical history, family history, habits, medications and allergies were reviewed with the patient today.  See snap shot for  HCM status. Most recent lab results reviewed with pt. Problem list and histories reviewed & adjusted, as indicated.  Additional history as below:     On Gabapentin 200mg at HS currently. Tried 300mg once and has some trouble walking the next day some. No mentation issues then. Has not tried that dose again. No side effects with 200mg dose. Minimal pain  During the day. Pain flares in back and down LLE about midnight or 1 AM for a few hrs. Taking Gabapentin at 10-11 PM. No B/B incontinence. MRI LS spine results reviewed       FINDINGS: Five lumbar vertebrae are assumed. Advanced apophyseal joint  degenerative arthrosis is noted at L4-L5 and L3-L4, associated with  0.5 cm degenerative grade 1 spondylolisthesis at L4-L5 and 0.4 cm  degenerative grade 1 spondylolisthesis at L3-L4, increased since  5/5/2009. Vertebral body heights are within normal limits.  Degenerative loss of disc signal is noted throughout the lumbar spine  with mild loss of disc height at L4-L5, similar in appearance to 2009.  The conus medullaris is unremarkable in appearance on the sagittal  images.      L1-L2: Mild annular bulge with minimal foraminal narrowing below the  exiting nerve roots. No central stenosis.     L2-L3: No disc bulge or herniation. No central or foraminal stenosis.     L3-L4: Mild annular bulge, degenerative grade 1 spondylolisthesis and  ligamentum flavum thickening resulting in mild to moderate central  stenosis, similar if not slightly increased since  "2009. Mild bilateral  foraminal stenosis is also noted.     L4-L5: Degenerative spondylolisthesis, ligamentum flavum thickening,  and annular bulge results in severe central and bilateral subarticular  lateral stenosis, similar if not increased since 2009. Moderate  bilateral foraminal stenosis is also noted.     L5-S1: No disc bulge or herniation. No central or foraminal stenosis.         IMPRESSION:  1. 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  2. Multilevel central stenosis, greatest at L4-L5 and next greatest at  L3-L4, similar if not slightly progressed since 2009.  3. Multilevel foraminal stenosis, greatest bilaterally at L4-L5 and  next greatest bilaterally at L3-L4.      Additional ROS:   Constitutional, HEENT, Cardiovascular, Pulmonary, GI and , Neuro, MSK and Psych review of systems/symptoms are otherwise negative or unchanged from previous, except as noted above.      OBJECTIVE:  /72  Pulse 90  Temp 98.4  F (36.9  C) (Oral)  Wt 126 lb (57.2 kg)  LMP 10/03/1969  SpO2 97%  BMI 25.45 kg/m2   Estimated body mass index is 25.45 kg/(m^2) as calculated from the following:    Height as of 2/21/17: 4' 11\" (1.499 m).    Weight as of this encounter: 126 lb (57.2 kg).  Eye: PERRL, EOMI  HENT: ear canals and TM's normal and nose and mouth without ulcers or lesions   Neck: no adenopathy. Thyroid normal to palpation. No bruits  Pulm: Lungs clear to auscultation   CV: Regular rates and rhythm  GI: Soft, nontender, Normal active bowel sounds, No hepatosplenomegaly or masses palpable  Ext: Peripheral pulses intact. No edema.  Neuro: Normal strength and tone, sensory exam grossly normal  Back: Tenderness to palpation left paralumbar area. Neg SLRT right and equivocal left    Assessment/Plan: (See plan discussion below for further details)  1. Chronic left lumbar radiculopathy  See plan " discussion below. Discussed hoe epidural injection would be done if needed in addition    Plan discussion:   Increase Gabapentin again  to 3 capsules (total 300mg)  daily at 9 PM  If side effects, then call nurseline tomorrow [phone]. Otherwise call update Friday if improved symptoms with higher dose  If not quite improved, will order epidural steroid injection at [facility] for caudal approach in central canal  Possible future physical therapy vs additional pain medication (Tramadol) at bedtime only    >25 minutes was spent with the patient today with more than 50% of the appointment providing counseling/education re LESI and other treatment options for central stenosis, spondylolisthesis, etc   and coordination of care    Venkatesh Cardenas MD  Internal Medicine Department  [facility]

## 2017-07-24 NOTE — MR AVS SNAPSHOT
"              After Visit Summary   7/24/2017    Tess Sellers    MRN: [id number]           Patient Information     Date Of Birth          1935        Visit Information        Provider Department      7/24/2017 11:00 AM Venkatesh Cardenas MD [facility]        Care Instructions     Increase Gabapentin to 3 capsules (total 300mg)  daily at 9 PM  If side effects, then call nurseline tomorrow [phone]. Otherwise call uddate Friday if improved  If not quite improved, will order epidural steroid injection at [facility]  Possible future physical therapy vs additional pain medication at bedtime          Follow-ups after your visit        Who to contact     If you have questions or need follow up information about today's clinic visit or your schedule please contact [facility] directly at [phone].  Normal or non-critical lab and imaging results will be communicated to you by [address], letter or phone within 4 business days after the clinic has received the results. If you do not hear from us within 7 days, please contact the clinic through [address] or phone. If you have a critical or abnormal lab result, we will notify you by phone as soon as possible.  Submit refill requests through [employer] or call your pharmacy and they will forward the refill request to us. Please allow 3 business days for your refill to be completed.          Additional Information About Your Visit        MyChart Information     [employer] lets you send messages to your doctor, view your test results, renew your prescriptions, schedule appointments and more. To sign up, go to [email or web address] . Click on \"Log in\" on the left side of the screen, which will take you to the Welcome page. Then click on \"Sign up Now\" on the right side of the page.     You will be asked to enter the access code listed below, as well as some personal information. Please follow the directions to create your username " and password.     Your access code is: [email or web address]  Expires: 10/12/2017  6:05 PM     Your access code will  in 90 days. If you need help or a new code, please call your [place] clinic or [phone].        Care EveryWhere ID     This is your Care EveryWhere ID. This could be used by other organizations to access your [place] medical records  [id number]        Your Vitals Were     Pulse Temperature Last Period Pulse Oximetry BMI (Body Mass Index)       90 98.4  F (36.9  C) (Oral) 10/03/1969 97% 25.45 kg/m2        Blood Pressure from Last 3 Encounters:   17 130/72   17 134/78   17 154/84    Weight from Last 3 Encounters:   17 126 lb (57.2 kg)   17 125 lb (56.7 kg)   17 127 lb (57.6 kg)              Today, you had the following     No orders found for display       Primary Care Provider Office Phone # Fax #    Venkatesh Cardenas [phone]       [facility] [address] [facility] [address]        Equal Access to Services     RICKY MILLS AH: Hadii ede hatch hadasho Soomaali, waaxda luqadaha, qaybta kaalmada adeegyada, yudi lockhart. So [facility] [phone].    ATENCIÓN: Si habla español, tiene a hughes disposición servicios gratuitos de asistencia lingüística. Llame al [phone].    We comply with applicable federal civil rights laws and [place] laws. We do not discriminate on the basis of race, color, national origin, age, disability sex, sexual orientation or gender identity.            Thank you!     Thank you for choosing [facility]  for your care. Our goal is always to provide you with excellent care. Hearing back from our patients is one way we can continue to improve our services. Please take a few minutes to complete the written survey that you may receive in the mail after your visit with us. Thank you!             Your Updated Medication List - Protect others around you: Learn how to safely  use, store and throw away your medicines at www.disposemymeds.org.          This list is accurate as of: 7/24/17 11:49 AM.  Always use your most recent med list.                   Brand Name Dispense Instructions for use Diagnosis    acetaminophen 325 MG tablet    TYLENOL    100 tablet    Take 2 tablets (650 mg) by mouth every 4 hours as needed for other (mild pain)    Post-operative state       ASPIRIN NOT PRESCRIBED    INTENTIONAL    0    due to dyspepsia, reflux    Type II or unspecified type diabetes mellitus without mention of complication, not stated as uncontrolled       blood glucose monitoring test strip    no brand specified    100 strip    Check blood sugar daily    Type 2 diabetes mellitus with diabetic polyneuropathy, without long-term current use of insulin (H)       EPINEPHrine 0.3 MG/0.3ML injection 2-pack    EPIPEN 2-EFE    2 each    Inject 0.3 mLs (0.3 mg) into the muscle once as needed for anaphylaxis    Bee sting reaction       furosemide 20 MG tablet    LASIX    90 tablet    One tablet 3 times per week (Monday, Wednesday, Friday)    Edema, unspecified type       gabapentin 100 MG capsule    NEURONTIN    90 capsule    1-3 capsules at bedtime    Lumbar radiculopathy       glipiZIDE 5 MG tablet    GLUCOTROL    180 tablet    Take 1 tablet (5 mg) by mouth 2 times daily (before meals)    Type 2 diabetes mellitus with diabetic polyneuropathy, without long-term current use of insulin (H)       losartan 100 MG tablet    COZAAR    90 tablet    Take 1 tablet (100 mg) by mouth daily    Essential hypertension, benign       lovastatin 40 MG tablet    MEVACOR    90 tablet    Take 1 tablet (40 mg) by mouth At Bedtime    Hyperlipidemia LDL goal <100       metFORMIN 1000 MG tablet    GLUCOPHAGE    180 tablet    1 tab twice a day    Type 2 diabetes mellitus with diabetic polyneuropathy, without long-term current use of insulin (H)       Multi-vitamin Tabs tablet   Generic drug:  multivitamin, therapeutic with  minerals     0    1 TABLET DAILY    Osteoporosis, unspecified       pyridoxine 100 MG tablet    VITAMIN B-6    0    1 x daily    Breast tenderness

## 2017-07-28 ENCOUNTER — TELEPHONE (OUTPATIENT)
Dept: INTERNAL MEDICINE | Facility: CLINIC | Age: 82
End: 2017-07-28

## 2017-07-28 NOTE — TELEPHONE ENCOUNTER
Reason for Call:  Other     Detailed comments: FYI - Doctor wanted patient to call in and let her know how she is doing with her sciatica medication - 3 caps of gabapentin - working well - no sharp pain in legs any more, taking Tylenol with it. No after effect as well.  Please call Tess and let her know how long to keep taking it. Can she start mowing her lawn again?  Can leave message.      Phone Number Patient can be reached at: Home number on file [phone] (home)    Best Time: Any time    Can we leave a detailed message on this number? YES    Call taken on 7/28/2017 at 1:25 PM by Simran Haskins

## 2017-07-28 NOTE — TELEPHONE ENCOUNTER
Spoke with pt. Isabel brumfield as feels comfortable doing. If pain returns,then lessen activity. Offered changing Gabapentin to 300mg capsule, 1 capsule daily but pt prefers to continue three 100mg capsules at bedtime for now

## 2017-10-05 ENCOUNTER — HOSPITAL ENCOUNTER (EMERGENCY)
Facility: CLINIC | Age: 82
Discharge: HOME | End: 2017-10-05
Attending: EMERGENCY MEDICINE | Admitting: EMERGENCY MEDICINE
Payer: COMMERCIAL

## 2017-10-05 VITALS
DIASTOLIC BLOOD PRESSURE: 95 MMHG | WEIGHT: 125 LBS | HEIGHT: 59 IN | TEMPERATURE: 98.3 F | SYSTOLIC BLOOD PRESSURE: 148 MMHG | OXYGEN SATURATION: 97 % | BODY MASS INDEX: 25.2 KG/M2 | RESPIRATION RATE: 18 BRPM | HEART RATE: 101 BPM

## 2017-10-05 DIAGNOSIS — E11.9 TYPE 2 DIABETES MELLITUS WITHOUT COMPLICATION, WITHOUT LONG-TERM CURRENT USE OF INSULIN (H): ICD-10-CM

## 2017-10-05 DIAGNOSIS — T63.481A INSECT STINGS, ACCIDENTAL OR UNINTENTIONAL, INITIAL ENCOUNTER: ICD-10-CM

## 2017-10-05 DIAGNOSIS — L50.9 URTICARIA: ICD-10-CM

## 2017-10-05 LAB — GLUCOSE BLDC GLUCOMTR-MCNC: 178 MG/DL (ref 70–99)

## 2017-10-05 PROCEDURE — 25000125 ZZHC RX 250: Performed by: EMERGENCY MEDICINE

## 2017-10-05 PROCEDURE — 99283 EMERGENCY DEPT VISIT LOW MDM: CPT

## 2017-10-05 PROCEDURE — 25000132 ZZH RX MED GY IP 250 OP 250 PS 637: Performed by: EMERGENCY MEDICINE

## 2017-10-05 PROCEDURE — 00000146 ZZHCL STATISTIC GLUCOSE BY METER IP

## 2017-10-05 RX ORDER — DIPHENHYDRAMINE HCL 25 MG
50 CAPSULE ORAL ONCE
Status: COMPLETED | OUTPATIENT
Start: 2017-10-05 | End: 2017-10-05

## 2017-10-05 RX ORDER — PREDNISONE 20 MG/1
40 TABLET ORAL ONCE
Status: COMPLETED | OUTPATIENT
Start: 2017-10-05 | End: 2017-10-05

## 2017-10-05 RX ORDER — PREDNISONE 20 MG/1
TABLET ORAL
Qty: 4 TABLET | Refills: 0 | Status: SHIPPED | OUTPATIENT
Start: 2017-10-05 | End: 2017-11-09

## 2017-10-05 RX ADMIN — DIPHENHYDRAMINE HYDROCHLORIDE 50 MG: 25 CAPSULE ORAL at 18:56

## 2017-10-05 RX ADMIN — PREDNISONE 40 MG: 20 TABLET ORAL at 20:04

## 2017-10-05 RX ADMIN — RANITIDINE HYDROCHLORIDE 150 MG: 150 TABLET, FILM COATED ORAL at 18:56

## 2017-10-05 ASSESSMENT — ENCOUNTER SYMPTOMS
SHORTNESS OF BREATH: 0
TROUBLE SWALLOWING: 0
CHEST TIGHTNESS: 0

## 2017-10-05 NOTE — ED AVS SNAPSHOT
[facility]    [address] [facility] [address]    Phone:  [phone]    Fax:  [phone]                                       Tess Sellers   MRN: [id number]    Department:  [facility]   Date of Visit:  10/5/2017           Patient Information     Date Of Birth          1935        Your diagnoses for this visit were:     Urticaria     Insect stings, accidental or unintentional, initial encounter     Type 2 diabetes mellitus without complication, without long-term current use of insulin (H)        You were seen by Eva Harp MD.      Follow-up Information     Follow up with Venkatesh Cardenas MD.    Specialty:  Internal Medicine    Why:  within 2-3 days as needed    Contact information:    [address] [facility] [address]  [phone]          Follow up with [facility].    Specialty:  EMERGENCY MEDICINE    Why:  As needed, If symptoms worsen    Contact information:    [address] [facility] [address] [phone]      Discharge References/Attachments     ALLERGIC REACTION, INSECT (GENERAL) (ENGLISH)      24 Hour Appointment Hotline       To make an appointment at any [facility], call [phone] ([phone]). If you don't have a family doctor or clinic, we will help you find one. [place] clinics are conveniently located to serve the needs of you and your family.             Review of your medicines      START taking        Dose / Directions Last dose taken    predniSONE 20 MG tablet   Commonly known as:  DELTASONE   Quantity:  4 tablet        Take 40mg (2 tablets) by mouth nightly for two more days   Refills:  0          Our records show that you are taking the medicines listed below. If these are incorrect, please call your family doctor or clinic.        Dose / Directions Last dose taken    acetaminophen 325 MG tablet   Commonly known as:   TYLENOL   Dose:  650 mg   Quantity:  100 tablet        Take 2 tablets (650 mg) by mouth every 4 hours as needed for other (mild pain)   Refills:  0        ASPIRIN NOT PRESCRIBED   Commonly known as:  INTENTIONAL   Quantity:  0        due to dyspepsia, reflux   Refills:  0        blood glucose monitoring test strip   Commonly known as:  no brand specified   Quantity:  100 strip        Check blood sugar daily   Refills:  3        furosemide 20 MG tablet   Commonly known as:  LASIX   Quantity:  90 tablet        One tablet 3 times per week (Monday, Wednesday, Friday)   Refills:  1        gabapentin 100 MG capsule   Commonly known as:  NEURONTIN   Quantity:  90 capsule        1-3 capsules at bedtime   Refills:  11        glipiZIDE 5 MG tablet   Commonly known as:  GLUCOTROL   Dose:  5 mg   Quantity:  180 tablet        Take 1 tablet (5 mg) by mouth 2 times daily (before meals)   Refills:  3        losartan 100 MG tablet   Commonly known as:  COZAAR   Dose:  100 mg   Quantity:  90 tablet        Take 1 tablet (100 mg) by mouth daily   Refills:  3        lovastatin 40 MG tablet   Commonly known as:  MEVACOR   Dose:  40 mg   Quantity:  90 tablet        Take 1 tablet (40 mg) by mouth At Bedtime   Refills:  3        metFORMIN 1000 MG tablet   Commonly known as:  GLUCOPHAGE   Quantity:  180 tablet        1 tab twice a day   Refills:  3        Multi-vitamin Tabs tablet   Quantity:  0   Generic drug:  multivitamin, therapeutic with minerals        1 TABLET DAILY   Refills:  0        pyridoxine 100 MG tablet   Commonly known as:  VITAMIN B-6   Quantity:  0        1 x daily   Refills:  0                Prescriptions were sent or printed at these locations (1 Prescription)                   Other Prescriptions                Printed at Department/Unit printer (1 of 1)         predniSONE (DELTASONE) 20 MG tablet                Orders Needing Specimen Collection     None      Pending Results     No orders found from 10/3/2017 to  10/6/2017.            Pending Culture Results     No orders found from 10/3/2017 to 10/6/2017.            Pending Results Instructions     If you had any lab results that were not finalized at the time of your Discharge, you can call the ED Lab Result RN at [phone]. You will be contacted by this team for any positive Lab results or changes in treatment. The nurses are available 7 days a week from 10A to 6:30P.  You can leave a message 24 hours per day and they will return your call.        Test Results From Your Hospital Stay               Clinical Quality Measure: Blood Pressure Screening     Your blood pressure was checked while you were in the emergency department today. The last reading we obtained was  BP: (!) 148/95 . Please read the guidelines below about what these numbers mean and what you should do about them.  If your systolic blood pressure (the top number) is less than 120 and your diastolic blood pressure (the bottom number) is less than 80, then your blood pressure is normal. There is nothing more that you need to do about it.  If your systolic blood pressure (the top number) is 120-139 or your diastolic blood pressure (the bottom number) is 80-89, your blood pressure may be higher than it should be. You should have your blood pressure rechecked within a year by a primary care provider.  If your systolic blood pressure (the top number) is 140 or greater or your diastolic blood pressure (the bottom number) is 90 or greater, you may have high blood pressure. High blood pressure is treatable, but if left untreated over time it can put you at risk for heart attack, stroke, or kidney failure. You should have your blood pressure rechecked by a primary care provider within the next 4 weeks.  If your provider in the emergency department today gave you specific instructions to follow-up with your doctor or provider even sooner than that, you should follow that instruction and not wait for up to 4 weeks  "for your follow-up visit.        Thank you for choosing [place]       Thank you for choosing [facility] [place] for your care. Our goal is always to provide you with excellent care. Hearing back from our patients is one way we can continue to improve our services. Please take a few minutes to complete the written survey that you may receive in the mail after you visit with us. Thank you!        [employer] Information     [employer] lets you send messages to your doctor, view your test results, renew your prescriptions, schedule appointments and more. To sign up, go to [email or web address] . Click on \"Log in\" on the left side of the screen, which will take you to the Welcome page. Then click on \"Sign up Now\" on the right side of the page.     You will be asked to enter the access code listed below, as well as some personal information. Please follow the directions to create your username and password.     Your access code is: [email or web address]  Expires: 10/12/2017  6:05 PM     Your access code will  in 90 days. If you need help or a new code, please call your [place] clinic or [phone].        Care EveryWhere ID     This is your Care EveryWhere ID. This could be used by other organizations to access your [place] medical records  [id number]        Equal Access to Services     RICKY MILLS : Ernie De La O, waaxda luqadaha, qaybta kaalmada adeedwardyakizzy, yudi lockhart. So [facility] [phone].    ATENCIÓN: Si habla español, tiene a hughes disposición servicios gratuitos de asistencia lingüística. Llame al [phone].    We comply with applicable federal civil rights laws and [place] laws. We do not discriminate on the basis of race, color, national origin, age, disability, sex, sexual orientation, or gender identity.            After Visit Summary       This is your record. Keep this with you and show to your community pharmacist(s) and doctor(s) at your next visit.                  "

## 2017-10-05 NOTE — ED NOTES
Stung by a bee in the upper back.  Hives all over.  Patient alert and oriented x3.  Airway, breathing and circulation intact.

## 2017-10-05 NOTE — ED AVS SNAPSHOT
[facility]    [address]    [facility] [address]    Phone:  [phone]    Fax:  [phone]                                       Tess Sellers   MRN: [id number]    Department:  [facility] Emergency Department   Date of Visit:  10/5/2017           After Visit Summary Signature Page     I have received my discharge instructions, and my questions have been answered. I have discussed any challenges I see with this plan with the nurse or doctor.    ..........................................................................................................................................  Patient/Patient Representative Signature      ..........................................................................................................................................  Patient Representative Print Name and Relationship to Patient    ..................................................               ................................................  Date                                            Time    ..........................................................................................................................................  Reviewed by Signature/Title    ...................................................              ..............................................  Date                                                            Time

## 2017-10-05 NOTE — ED PROVIDER NOTES
History     Chief Complaint:  Itchy rash    HPI   Tess Sellers is a 82 year old female who presents with an itchy rash after being stung. The patient reports that 90 minutes ago she was bit or stung by an insect she did not actually see. She believes this was a bee as she has had a similar reaction to past bee stings for which she has a known allergies. Upon presentation here to the ED she has itchy hives on her back and chest. She denies any shortness of breath, wheezing, vomiting, dizziness, or light headedness. She has not taken any medication prior to arrival here in the ED. She states that she last checked her blood sugar this morning and it was normal at that time.     Allergies:  Atenolol  Atorvastatin calcium  Catopril  Clonidine  Diltiazem  Influenza virus vaccine H5n1  Lisinopril  Methyldopa  Naproxen  Norvasc  Pravachol  Spinolactone  Terazosin  Thiazide type diuretics  Verapamil    Medications:    Deltasone  Neurontin  Lasix  Cozaar  Glucotrol  Glucophage  Mevacor  Aspirin    Past Medical History:    Colon polyps  Diaphragmatic hernia without mention of obstruction or gangrene  Dyspepsia  GERD  Essential hypertension  Hyperlipidemia  Hypertrophy of breast  Idiopathic cyclic edema  Mixed incontinence  Osteoporosis  Peripheral neuropathy  Spinal stenosis, lumbar region, without neurogenic claudication  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  Smoking Status: No  Smokeless Tobacco: No  Alcohol Use: No  Marital Status:  Single     Review of Systems   HENT: Negative for trouble swallowing.    Respiratory: Negative for chest tightness and shortness of  "breath.    Skin: Positive for rash.   All other systems reviewed and are negative.    Physical Exam   Vitals:  Patient Vitals for the past 24 hrs:   BP Temp Temp src Pulse Resp SpO2 Height Weight   10/05/17 2007 (!) 148/95 - - 101 18 97 % - -   10/05/17 1827 (!) 158/102 98.3  F (36.8  C) Temporal 127 20 96 % 1.499 m (4' 11\") 56.7 kg (125 lb)     Physical Exam  General: Elderly female sitting upright  Eyes: PERRL, Conjunctive within normal limits  ENT: Moist mucous membranes, oropharynx clear.   CV: Normal S1S2, no murmur, rub or gallop. Regular rate and rhythm  Resp: Clear to auscultation bilaterally, no wheezes, rales or rhonchi. Normal respiratory effort.  GI: Abdomen is soft, nontender and nondistended. No palpable masses. No rebound or guarding.  MSK: Nontender. Normal active range of motion. Trace edema at ankles bilaterally.  Skin: Hive like raised erythematous rash scattered over torso along the bra line and underwear line. Warm and dry. No ecchymoses, purpura or petechiae. Nontender.  Neuro: Alert and oriented. Responds appropriately to all questions and commands. No focal findings appreciated. Normal muscle tone.  Psych: Normal mood and affect. Pleasant.    Emergency Department Course     Interventions:  1856 Benadryl, 50 mg, PO  1856 Zantac, 150 mg, PO  2004 Deltasone, 40 mg, PO     Emergency Department Course:  Nursing notes and vitals reviewed.  1839 I had my initial encounter with the patient.  I performed an exam of the patient as documented above.   Patient reassessed. Feels improved. Hives have diminished in appearance.  I discussed the treatment plan with the patient. They expressed understanding of this plan and consented to discharge. They will be discharged home with instructions for care and follow up. In addition, the patient will return to the emergency department if their symptoms persist, worsen, if new symptoms arise or if there is any concern.  All questions were answered.    Impression & " Plan      Medical Decision Making:  Tess Sellers is a 82 year old female who presents for evaluation of rash after likely bee/wasp sting.  Their rash and associated symptoms are most consistent with allergic phenomena.  This appears to be at this point a diffuse rash without signs of angioedema, respiratory compromise, shock, serious systemic reaction, etc.  No signs of serious infection, cellulitis, vasculitis, or other skin manifestation of a serious underlying disease.  Supportive outpatient management is indicated, medications for discharge noted above.  Close followup with primary care physician.  Return if  wheezing, progressive shortness of breath, facial or throat/tongue swelling.  Sent home with steroid and antihistamine therapy recommendation. Patient has epipen prescription and understands use.      Diagnosis:    ICD-10-CM    1. Urticaria L50.9    2. Insect stings, accidental or unintentional, initial encounter T63.481A    3. Type 2 diabetes mellitus without complication, without long-term current use of insulin (H) E11.9      Disposition:   Discharge    Discharge Medications:  New Prescriptions    PREDNISONE (DELTASONE) 20 MG TABLET    Take 40mg (2 tablets) by mouth nightly for two more days     Scribe Disclosure:  Shravan ROSS, am serving as a scribe at 6:41 PM on 10/5/2017 to document services personally performed by Eva Harp MD, based on my observations and the provider's statements to me.    10/5/2017   [facility] EMERGENCY DEPARTMENT       Eva Harp MD  10/12/17 2030

## 2017-10-06 NOTE — ED NOTES
Pt and daughter verbalize understanding of D/C plan. Pt reports feeling improved on D/C. Hives and itching have improved. Pt D/C ambulating well

## 2017-10-12 ENCOUNTER — TELEPHONE (OUTPATIENT)
Dept: INTERNAL MEDICINE | Facility: CLINIC | Age: 82
End: 2017-10-12

## 2017-10-25 DIAGNOSIS — R60.9 EDEMA, UNSPECIFIED TYPE: ICD-10-CM

## 2017-10-25 DIAGNOSIS — E11.42 TYPE 2 DIABETES MELLITUS WITH DIABETIC POLYNEUROPATHY, WITHOUT LONG-TERM CURRENT USE OF INSULIN (H): ICD-10-CM

## 2017-10-25 LAB
ANION GAP SERPL CALCULATED.3IONS-SCNC: 6 MMOL/L (ref 3–14)
BUN SERPL-MCNC: 22 MG/DL (ref 7–30)
CALCIUM SERPL-MCNC: 9.5 MG/DL (ref 8.5–10.1)
CHLORIDE SERPL-SCNC: 110 MMOL/L (ref 94–109)
CO2 SERPL-SCNC: 27 MMOL/L (ref 20–32)
CREAT SERPL-MCNC: 0.84 MG/DL (ref 0.52–1.04)
GFR SERPL CREATININE-BSD FRML MDRD: 64 ML/MIN/1.7M2
GLUCOSE SERPL-MCNC: 107 MG/DL (ref 70–99)
HBA1C MFR BLD: 7.6 % (ref 4.3–6)
POTASSIUM SERPL-SCNC: 4.2 MMOL/L (ref 3.4–5.3)
SODIUM SERPL-SCNC: 143 MMOL/L (ref 133–144)

## 2017-10-25 PROCEDURE — 36415 COLL VENOUS BLD VENIPUNCTURE: CPT | Performed by: INTERNAL MEDICINE

## 2017-10-25 PROCEDURE — 83036 HEMOGLOBIN GLYCOSYLATED A1C: CPT | Performed by: INTERNAL MEDICINE

## 2017-10-25 PROCEDURE — 80048 BASIC METABOLIC PNL TOTAL CA: CPT | Performed by: INTERNAL MEDICINE

## 2017-11-05 DIAGNOSIS — E78.5 HYPERLIPIDEMIA LDL GOAL <100: ICD-10-CM

## 2017-11-07 RX ORDER — LOVASTATIN 40 MG
TABLET ORAL
Qty: 30 TABLET | Refills: 2 | Status: SHIPPED | OUTPATIENT
Start: 2017-11-07 | End: 2018-04-02

## 2017-11-09 ENCOUNTER — OFFICE VISIT (OUTPATIENT)
Dept: INTERNAL MEDICINE | Facility: CLINIC | Age: 82
End: 2017-11-09
Payer: COMMERCIAL

## 2017-11-09 VITALS
SYSTOLIC BLOOD PRESSURE: 138 MMHG | DIASTOLIC BLOOD PRESSURE: 88 MMHG | HEART RATE: 76 BPM | OXYGEN SATURATION: 98 % | BODY MASS INDEX: 25.85 KG/M2 | WEIGHT: 128 LBS | TEMPERATURE: 97.8 F

## 2017-11-09 DIAGNOSIS — G47.00 INSOMNIA, UNSPECIFIED TYPE: ICD-10-CM

## 2017-11-09 DIAGNOSIS — E11.42 TYPE 2 DIABETES MELLITUS WITH DIABETIC POLYNEUROPATHY, WITHOUT LONG-TERM CURRENT USE OF INSULIN (H): Primary | ICD-10-CM

## 2017-11-09 PROCEDURE — 99214 OFFICE O/P EST MOD 30 MIN: CPT | Performed by: INTERNAL MEDICINE

## 2017-11-09 NOTE — MR AVS SNAPSHOT
After Visit Summary   11/9/2017    Tess Sellers    MRN: [id number]           Patient Information     Date Of Birth          1935        Visit Information        Provider Department      11/9/2017 11:30 AM Venkatesh aCrdenas MD [facility]        Care Instructions    Continue current meds  Call  [phone]   to schedule a future lab appointment  fasting in  Mid February 2018. For fasting labs, please refrain from eating for 8 hours or more.  Be sure to  drink water and take your  medications the day of the test.   Reduce carbohydrate (sugars, starchy foods such as bread, rice, potato, pasta, etc) intake  in your diet and increase the amount of color on your plate with fruits, vegetables and lean meats.   Exercises within your home over the winter  Reduce caffeine intake during the day and stop intake after noon to see if helps sleep. If not, may try over the counter melatonin 5mg tab at bedtime as needed. Inform clinic if neither helpful          Follow-ups after your visit        Who to contact     If you have questions or need follow up information about today's clinic visit or your schedule please contact [facility] directly at [phone].  Normal or non-critical lab and imaging results will be communicated to you by MyChart, letter or phone within 4 business days after the clinic has received the results. If you do not hear from us within 7 days, please contact the clinic through [employer] or phone. If you have a critical or abnormal lab result, we will notify you by phone as soon as possible.  Submit refill requests through [employer] or call your pharmacy and they will forward the refill request to us. Please allow 3 business days for your refill to be completed.          Additional Information About Your Visit        MyChart Information     [employer] lets you send messages to your doctor, view your test results, renew your prescriptions, schedule  "appointments and more. To sign up, go to [email or web address] . Click on \"Log in\" on the left side of the screen, which will take you to the Welcome page. Then click on \"Sign up Now\" on the right side of the page.     You will be asked to enter the access code listed below, as well as some personal information. Please follow the directions to create your username and password.     Your access code is: [email or web address]  Expires: 2018 12:06 PM     Your access code will  in 90 days. If you need help or a new code, please call your [place] clinic or [phone].        Care EveryWhere ID     This is your Care EveryWhere ID. This could be used by other organizations to access your [place] medical records  [id number]        Your Vitals Were     Pulse Temperature Last Period Pulse Oximetry BMI (Body Mass Index)       76 97.8  F (36.6  C) (Oral) 10/03/1969 98% 25.85 kg/m2        Blood Pressure from Last 3 Encounters:   17 138/88   10/05/17 (!) 148/95   17 130/72    Weight from Last 3 Encounters:   17 128 lb (58.1 kg)   10/05/17 125 lb (56.7 kg)   17 126 lb (57.2 kg)              Today, you had the following     No orders found for display       Primary Care Provider Office Phone # Fax #    Venkatesh Cardenas [phone]       [address] [place] [address]        Equal Access to Services     [facility]: Hadii ede lundberg Sosuly, waaxda luqadaha, qaybta kaalmayudi roth. So [facility] [phone].    ATENCIÓN: Si habla español, tiene a hughes disposición servicios gratuitos de asistencia lingüística. Llame al [phone].    We comply with applicable federal civil rights laws and [place] laws. We do not discriminate on the basis of race, color, national origin, age, disability, sex, sexual orientation, or gender identity.            Thank you!     Thank you for choosing [facility]  for your care. Our " goal is always to provide you with excellent care. Hearing back from our patients is one way we can continue to improve our services. Please take a few minutes to complete the written survey that you may receive in the mail after your visit with us. Thank you!             Your Updated Medication List - Protect others around you: Learn how to safely use, store and throw away your medicines at www.disposemymeds.org.          This list is accurate as of: 11/9/17 12:06 PM.  Always use your most recent med list.                   Brand Name Dispense Instructions for use Diagnosis    acetaminophen 325 MG tablet    TYLENOL    100 tablet    Take 2 tablets (650 mg) by mouth every 4 hours as needed for other (mild pain)    Post-operative state       ASPIRIN NOT PRESCRIBED    INTENTIONAL    0    due to dyspepsia, reflux    Type II or unspecified type diabetes mellitus without mention of complication, not stated as uncontrolled       blood glucose monitoring test strip    no brand specified    100 strip    Check blood sugar daily    Type 2 diabetes mellitus with diabetic polyneuropathy, without long-term current use of insulin (H)       furosemide 20 MG tablet    LASIX    90 tablet    One tablet 3 times per week (Monday, Wednesday, Friday)    Edema, unspecified type       gabapentin 100 MG capsule    NEURONTIN    90 capsule    1-3 capsules at bedtime    Lumbar radiculopathy       glipiZIDE 5 MG tablet    GLUCOTROL    180 tablet    Take 1 tablet (5 mg) by mouth 2 times daily (before meals)    Type 2 diabetes mellitus with diabetic polyneuropathy, without long-term current use of insulin (H)       losartan 100 MG tablet    COZAAR    90 tablet    Take 1 tablet (100 mg) by mouth daily    Essential hypertension, benign       lovastatin 40 MG tablet    MEVACOR    30 tablet    TAKE 1 TABLET (40MG) BY MOUTH AT BEDTIME    Hyperlipidemia LDL goal <100       metFORMIN 1000 MG tablet    GLUCOPHAGE    180 tablet    1 tab twice a day    Type  2 diabetes mellitus with diabetic polyneuropathy, without long-term current use of insulin (H)       Multi-vitamin Tabs tablet   Generic drug:  multivitamin, therapeutic with minerals     0    1 TABLET DAILY    Osteoporosis, unspecified       pyridoxine 100 MG tablet    VITAMIN B-6    0    1 x daily    Breast tenderness

## 2017-11-09 NOTE — PROGRESS NOTES
SUBJECTIVE:   Tess Sellers is a 82 year old female who presents to clinic today for the following health issues:      Diabetes Follow-up    Patient is checking blood sugars: twice daily.    Blood sugar testing frequency justification: Uncontrolled diabetes  Results are as follows:       am - 120's to 150's       bedtime - 160's         Diabetic concerns: None and blood sugar frequently over 200     Symptoms of hypoglycemia (low blood sugar): none     Paresthesias (numbness or burning in feet) or sores: Yes swelling in feet     Date of last diabetic eye exam: 11/22/2016 (has appointment scheduled)    Hyperlipidemia Follow-Up      Rate your low fat/cholesterol diet?: good    Taking statin? yes    Other lipid medications/supplements?:  none    Hypertension Follow-up      Outpatient blood pressures are being checked at [facility].  Results are 130's /70's.  Low Salt Diet: low added salt      Amount of exercise or physical activity: None, due to Winter    Problems taking medications regularly: No    Medication side effects: none    Diet: low salt and low fat/cholesterol    Pt's past medical history, family history, habits, medications and allergies were reviewed with the patient today.  See snap shot for  HCM status. Most recent lab results reviewed with pt. Problem list and histories reviewed & adjusted, as indicated.  Additional history as below:    Denies CP, SOB, abdominal pain, polyuria, polydipsia, vision changes  skin problems.  Stable mild bilateral extremity neuropathy  Patient states her diet has not been as good recently and feels she can improve the some.  Edema well-controlled with using Lasix three times a week  Occasional issues with trouble falling asleep. Moderate caffeine use during the day.    Lab Results   Component Value Date     10/25/2017     Lab Results   Component Value Date    A1C 7.6 10/25/2017     Lab Results   Component Value Date    CHOL 175 02/14/2017     Lab Results   Component Value  "Date    LDL 96 02/14/2017     Lab Results   Component Value Date    HDL 58 02/14/2017     Lab Results   Component Value Date    TRIG 103 02/14/2017     Lab Results   Component Value Date    CR 0.84 10/25/2017     Lab Results   Component Value Date    ALT 35 02/14/2017     Lab Results   Component Value Date    AST 16 02/14/2017     Lab Results   Component Value Date    MICROL 8 08/19/2016     Lab Results   Component Value Date    TSH 1.69 02/14/2017          Additional ROS:   Constitutional, HEENT, Cardiovascular, Pulmonary, GI and , Neuro, MSK and Psych review of systems/symptoms are otherwise negative or unchanged from previous, except as noted above.      OBJECTIVE:  /88  Pulse 76  Temp 97.8  F (36.6  C) (Oral)  Wt 128 lb (58.1 kg)  LMP 10/03/1969  SpO2 98%  BMI 25.85 kg/m2   Estimated body mass index is 25.85 kg/(m^2) as calculated from the following:    Height as of 10/5/17: 4' 11\" (1.499 m).    Weight as of this encounter: 128 lb (58.1 kg).  Neck: no adenopathy. Thyroid normal to palpation. No bruits  Pulm: Lungs clear to auscultation   CV: Regular rates and rhythm  GI: Soft, nontender, Normal active bowel sounds, No hepatosplenomegaly or masses palpable  Ext: Peripheral pulses intact. Trace BLE edema.  Neuro: Normal strength and tone, sensory exam  mildly reduced to light touch sensation distal bilateral lower extremities    Assessment/Plan: (See plan discussion below for further details)  1. Type 2 diabetes mellitus with diabetic polyneuropathy, without long-term current use of insulin (H)  Overall controlled for age. Can improve some with improved diet.  See plan discussion below    2. Insomnia, unspecified type  See plan discussion below    Plan discussion:  Continue current meds  Call  [phone]   to schedule a future lab appointment  fasting in  Mid February 2018 as previously ordered. For fasting labs, please refrain from eating for 8 hours or more.  Be sure to  drink water and take your "  medications the day of the test.   Reduce carbohydrate (sugars, starchy foods such as bread, rice, potato, pasta, etc) intake  in your diet and increase the amount of color on your plate with fruits, vegetables and lean meats.   Exercises within your home over the winter  Reduce caffeine intake during the day and stop intake after noon to see if helps sleep. If not, may try over the counter melatonin 5mg tab at bedtime as needed. Inform clinic if neither helpful    Venkatesh Cardenas MD  Internal Medicine Department  [facility] (formerly [facility])

## 2017-11-09 NOTE — NURSING NOTE
"Chief Complaint   Patient presents with     Diabetes     Hyperlipidemia     Hypertension       Initial /88  Pulse 76  Temp 97.8  F (36.6  C) (Oral)  Wt 128 lb (58.1 kg)  LMP 10/03/1969  SpO2 98%  BMI 25.85 kg/m2 Estimated body mass index is 25.85 kg/(m^2) as calculated from the following:    Height as of 10/5/17: 4' 11\" (1.499 m).    Weight as of this encounter: 128 lb (58.1 kg).  Medication Reconciliation: complete  "

## 2017-11-09 NOTE — PATIENT INSTRUCTIONS
Continue current meds  Call  [phone]   to schedule a future lab appointment  fasting in  Mid February 2018. For fasting labs, please refrain from eating for 8 hours or more.  Be sure to  drink water and take your  medications the day of the test.   Reduce carbohydrate (sugars, starchy foods such as bread, rice, potato, pasta, etc) intake  in your diet and increase the amount of color on your plate with fruits, vegetables and lean meats.   Exercises within your home over the winter  Reduce caffeine intake during the day and stop intake after noon to see if helps sleep. If not, may try over the counter melatonin 5mg tab at bedtime as needed. Inform clinic if neither helpful

## 2017-11-16 DIAGNOSIS — E11.42 TYPE 2 DIABETES MELLITUS WITH DIABETIC POLYNEUROPATHY, WITHOUT LONG-TERM CURRENT USE OF INSULIN (H): ICD-10-CM

## 2017-11-28 ENCOUNTER — TRANSFERRED RECORDS (OUTPATIENT)
Dept: HEALTH INFORMATION MANAGEMENT | Facility: CLINIC | Age: 82
End: 2017-11-28

## 2018-03-04 DIAGNOSIS — I10 ESSENTIAL HYPERTENSION, BENIGN: ICD-10-CM

## 2018-03-06 ENCOUNTER — TELEPHONE (OUTPATIENT)
Dept: LAB | Facility: CLINIC | Age: 83
End: 2018-03-06

## 2018-03-06 RX ORDER — LOSARTAN POTASSIUM 100 MG/1
TABLET ORAL
Qty: 90 TABLET | Refills: 2 | Status: SHIPPED | OUTPATIENT
Start: 2018-03-06 | End: 2018-05-06

## 2018-03-06 NOTE — TELEPHONE ENCOUNTER
"Requested Prescriptions   Pending Prescriptions Disp Refills     losartan (COZAAR) 100 MG tablet [Pharmacy Med Name: Losartan Potassium Oral Tablet 100 MG] 90 tablet 2     Sig: TAKE ONE TABLET BY MOUTH ONE TIME DAILY    Angiotensin-II Receptors Passed    3/4/2018  7:00 AM       Passed - Blood pressure under 140/90 in past 12 months    BP Readings from Last 3 Encounters:   11/09/17 138/88   10/05/17 (!) 148/95   07/24/17 130/72                Passed - Recent (12 mo) or future (30 days) visit within the authorizing provider's specialty    Patient had office visit in the last year or has a visit in the next 30 days with authorizing provider.  See \"Patient Info\" tab in inbasket, or \"Choose Columns\" in Meds & Orders section of the refill encounter.          Last Written Prescription Date:  2/02/2017  Last Fill Quantity: 90,  # refills: 3   Last office visit: 11/9/2017 with prescribing provider:  11/09/2017   Future Office Visit:        Passed - Patient is age 18 or older       Passed - No active pregnancy on record       Passed - Normal serum creatinine on file in past 12 months    Recent Labs   Lab Test  10/25/17   0912   CR  0.84            Passed - Normal serum potassium on file in past 12 months    Recent Labs   Lab Test  10/25/17   0912   POTASSIUM  4.2                   Passed - No positive pregnancy test in past 12 months          "

## 2018-03-06 NOTE — TELEPHONE ENCOUNTER
Lab order extended. Please call pt to schedule fasting blood and urine lab appt in the next month and then see me a few days later. Check blood sugars twice a day (before breakfast and bedtime) for 4 days prior to the appointment with me and bring the results to the appointment.

## 2018-03-22 DIAGNOSIS — R60.9 EDEMA, UNSPECIFIED TYPE: ICD-10-CM

## 2018-03-22 DIAGNOSIS — E78.5 HYPERLIPIDEMIA LDL GOAL <100: ICD-10-CM

## 2018-03-22 DIAGNOSIS — E11.42 TYPE 2 DIABETES MELLITUS WITH DIABETIC POLYNEUROPATHY, WITHOUT LONG-TERM CURRENT USE OF INSULIN (H): ICD-10-CM

## 2018-03-22 DIAGNOSIS — I10 ESSENTIAL HYPERTENSION, BENIGN: ICD-10-CM

## 2018-03-22 LAB
ALBUMIN SERPL-MCNC: 3.8 G/DL (ref 3.4–5)
ALP SERPL-CCNC: 136 U/L (ref 40–150)
ALT SERPL W P-5'-P-CCNC: 51 U/L (ref 0–50)
ANION GAP SERPL CALCULATED.3IONS-SCNC: 8 MMOL/L (ref 3–14)
AST SERPL W P-5'-P-CCNC: 31 U/L (ref 0–45)
BILIRUB SERPL-MCNC: 0.5 MG/DL (ref 0.2–1.3)
BUN SERPL-MCNC: 20 MG/DL (ref 7–30)
CALCIUM SERPL-MCNC: 9.6 MG/DL (ref 8.5–10.1)
CHLORIDE SERPL-SCNC: 109 MMOL/L (ref 94–109)
CHOLEST SERPL-MCNC: 160 MG/DL
CO2 SERPL-SCNC: 27 MMOL/L (ref 20–32)
CREAT SERPL-MCNC: 1.03 MG/DL (ref 0.52–1.04)
CREAT UR-MCNC: 193 MG/DL
GFR SERPL CREATININE-BSD FRML MDRD: 51 ML/MIN/1.7M2
GLUCOSE SERPL-MCNC: 124 MG/DL (ref 70–99)
HBA1C MFR BLD: 7.2 % (ref 4.3–6)
HDLC SERPL-MCNC: 60 MG/DL
LDLC SERPL CALC-MCNC: 68 MG/DL
MICROALBUMIN UR-MCNC: 25 MG/L
MICROALBUMIN/CREAT UR: 13.11 MG/G CR (ref 0–25)
NONHDLC SERPL-MCNC: 100 MG/DL
POTASSIUM SERPL-SCNC: 4.1 MMOL/L (ref 3.4–5.3)
PROT SERPL-MCNC: 7.3 G/DL (ref 6.8–8.8)
SODIUM SERPL-SCNC: 144 MMOL/L (ref 133–144)
TRIGL SERPL-MCNC: 162 MG/DL

## 2018-03-22 PROCEDURE — 80053 COMPREHEN METABOLIC PANEL: CPT | Performed by: INTERNAL MEDICINE

## 2018-03-22 PROCEDURE — 83036 HEMOGLOBIN GLYCOSYLATED A1C: CPT | Performed by: INTERNAL MEDICINE

## 2018-03-22 PROCEDURE — 80061 LIPID PANEL: CPT | Performed by: INTERNAL MEDICINE

## 2018-03-22 PROCEDURE — 82043 UR ALBUMIN QUANTITATIVE: CPT | Performed by: INTERNAL MEDICINE

## 2018-03-22 PROCEDURE — 36415 COLL VENOUS BLD VENIPUNCTURE: CPT | Performed by: INTERNAL MEDICINE

## 2018-03-25 DIAGNOSIS — R60.9 EDEMA, UNSPECIFIED TYPE: ICD-10-CM

## 2018-03-27 DIAGNOSIS — E11.42 TYPE 2 DIABETES MELLITUS WITH DIABETIC POLYNEUROPATHY, WITHOUT LONG-TERM CURRENT USE OF INSULIN (H): ICD-10-CM

## 2018-03-27 RX ORDER — FUROSEMIDE 20 MG
TABLET ORAL
Qty: 38 TABLET | Refills: 1 | Status: SHIPPED | OUTPATIENT
Start: 2018-03-27 | End: 2018-10-02

## 2018-03-27 RX ORDER — GLIPIZIDE 5 MG/1
TABLET ORAL
Qty: 180 TABLET | Refills: 0 | Status: SHIPPED | OUTPATIENT
Start: 2018-03-27 | End: 2018-05-06

## 2018-03-27 NOTE — TELEPHONE ENCOUNTER
"Requested Prescriptions   Pending Prescriptions Disp Refills     metFORMIN (GLUCOPHAGE) 1000 MG tablet [Pharmacy Med Name: MetFORMIN HCl Oral Tablet 1000 MG]  Last Written Prescription Date:  02/22/2017  Last Fill Quantity: 180,  # refills: 3   Last office visit: 11/9/2017 with prescribing provider:     Future Office Visit:     180 tablet 2     Sig: TAKE ONE TABLET BY MOUTH TWICE DAILY    Biguanide Agents Passed    3/27/2018  7:00 AM       Passed - Blood pressure less than 140/90 in past 6 months    BP Readings from Last 3 Encounters:   11/09/17 138/88   10/05/17 (!) 148/95   07/24/17 130/72                Passed - Patient has documented LDL within the past 12 mos.    Recent Labs   Lab Test  03/22/18   0901   LDL  68            Passed - Patient has had a Microalbumin in the past 12 mos.    Recent Labs   Lab Test  03/22/18   1032   MICROL  25   UMALCR  13.11            Passed - Patient is age 10 or older       Passed - Patient has documented A1c within the specified period of time.    Recent Labs   Lab Test  03/22/18   0901   A1C  7.2*            Passed - Patient's CR is NOT>1.4 OR Patient's EGFR is NOT<45 within past 12 mos.    Recent Labs   Lab Test  03/22/18   0901   GFRESTIMATED  51*   GFRESTBLACK  62       Recent Labs   Lab Test  03/22/18   0901   CR  1.03            Passed - Patient does NOT have a diagnosis of CHF.       Passed - Patient is not pregnant       Passed - Patient has not had a positive pregnancy test within the past 12 mos.        Passed - Recent (6 mo) or future (30 days) visit within the authorizing provider's specialty    Patient had office visit in the last 6 months or has a visit in the next 30 days with authorizing provider or within the authorizing provider's specialty.  See \"Patient Info\" tab in inbasket, or \"Choose Columns\" in Meds & Orders section of the refill encounter.            glipiZIDE (GLUCOTROL) 5 MG tablet [Pharmacy Med Name: GlipiZIDE Oral Tablet 5 MG]  Last Written " "Prescription Date:  02/22/2017  Last Fill Quantity: 180,  # refills: 3   Last office visit: 11/9/2017 with prescribing provider:     Future Office Visit:     180 tablet 2     Sig: TAKE ONE TABLET BY MOUTH TWICE DAILY BEFORE MEALS    Sulfonylurea Agents Passed    3/27/2018  7:00 AM       Passed - Blood pressure less than 140/90 in past 6 months    BP Readings from Last 3 Encounters:   11/09/17 138/88   10/05/17 (!) 148/95   07/24/17 130/72                Passed - Patient has documented LDL within the past 12 mos.    Recent Labs   Lab Test  03/22/18 0901   LDL  68            Passed - Patient has had a Microalbumin in the past 12 mos.    Recent Labs   Lab Test  03/22/18   1032   MICROL  25   UMALCR  13.11            Passed - Patient has documented A1c within the specified period of time.    Recent Labs   Lab Test  03/22/18 0901   A1C  7.2*            Passed - Patient is age 18 or older       Passed - No active pregnancy on record       Passed - Patient has a recent creatinine (normal) within the past 12 mos.    Recent Labs   Lab Test  03/22/18   0901   CR  1.03            Passed - Patient has not had a positive pregnancy test within the past 12 mos.       Passed - Recent (6 mo) or future (30 days) visit within the authorizing provider's specialty    Patient had office visit in the last 6 months or has a visit in the next 30 days with authorizing provider or within the authorizing provider's specialty.  See \"Patient Info\" tab in inbasket, or \"Choose Columns\" in Meds & Orders section of the refill encounter.              "

## 2018-03-27 NOTE — TELEPHONE ENCOUNTER
"Requested Prescriptions   Pending Prescriptions Disp Refills     furosemide (LASIX) 20 MG tablet [Pharmacy Med Name: Furosemide Oral Tablet 20 MG] 38 tablet 0     Sig: TAKE ONE TABLET BY MOUTH THREE TIMES PER WEEK.  (MONDAY, WEDNESDAY, AND FRIDAY.)    Diuretics (Including Combos) Protocol Passed    3/25/2018  7:00 AM       Passed - Blood pressure under 140/90 in past 12 months    BP Readings from Last 3 Encounters:   11/09/17 138/88   10/05/17 (!) 148/95   07/24/17 130/72                Passed - Recent (12 mo) or future (30 days) visit within the authorizing provider's specialty    Patient had office visit in the last 12 months or has a visit in the next 30 days with authorizing provider or within the authorizing provider's specialty.  See \"Patient Info\" tab in inbasket, or \"Choose Columns\" in Meds & Orders section of the refill encounter.           Passed - Patient is age 18 or older       Passed - No active pregancy on record       Passed - Normal serum creatinine on file in past 12 months    Recent Labs   Lab Test  03/22/18   0901   CR  1.03             Passed - Normal serum potassium on file in past 12 months    Recent Labs   Lab Test  03/22/18   0901   POTASSIUM  4.1                   Passed - Normal serum sodium on file in past 12 months    Recent Labs   Lab Test  03/22/18   0901   NA  144             Passed - No positive pregnancy test in past 12 months        Prescription approved per [facility] [place].    "

## 2018-04-02 DIAGNOSIS — E78.5 HYPERLIPIDEMIA LDL GOAL <100: ICD-10-CM

## 2018-04-02 NOTE — TELEPHONE ENCOUNTER
"Requested Prescriptions   Pending Prescriptions Disp Refills     lovastatin (MEVACOR) 40 MG tablet [Pharmacy Med Name: Lovastatin Oral Tablet 40 MG]  Last Written Prescription Date:  11/7/2017  Last Fill Quantity: 30,  # refills: 2   Last office visit: 11/9/2017 with prescribing provider:  Venkatesh Cardenas   Future Office Visit:     30 tablet 1     Sig: TAKE 1 TABLET (40MG) BY MOUTH AT BEDTIME    Statins Protocol Passed    4/2/2018  7:00 AM       Passed - LDL on file in past 12 months    Recent Labs   Lab Test  03/22/18   0901   LDL  68            Passed - No abnormal creatine kinase in past 12 months    Recent Labs   Lab Test  02/14/17   0921   CKT  35               Passed - Recent (12 mo) or future (30 days) visit within the authorizing provider's specialty    Patient had office visit in the last 12 months or has a visit in the next 30 days with authorizing provider or within the authorizing provider's specialty.  See \"Patient Info\" tab in inbasket, or \"Choose Columns\" in Meds & Orders section of the refill encounter.           Passed - Patient is age 18 or older       Passed - No active pregnancy on record       Passed - No positive pregnancy test in past 12 months          "

## 2018-04-03 RX ORDER — LOVASTATIN 40 MG
TABLET ORAL
Qty: 30 TABLET | Refills: 6 | Status: SHIPPED | OUTPATIENT
Start: 2018-04-03 | End: 2018-05-06

## 2018-04-06 DIAGNOSIS — E78.5 HYPERLIPIDEMIA LDL GOAL <100: ICD-10-CM

## 2018-04-07 NOTE — TELEPHONE ENCOUNTER
"Requested Prescriptions   Pending Prescriptions Disp Refills     lovastatin (MEVACOR) 40 MG tablet [Pharmacy Med Name: Lovastatin Oral Tablet 40 MG]  This request may be a duplicate.    Last Written Prescription Date:  4/3/18  Last Fill Quantity: 30 tablet,  # refills: 6   Last Office Visit with [facility], [facility] or [facility] prescribing provider:  11/9/17   Future Office Visit:      30 tablet 1     Sig: TAKE 1 TABLET (40MG) BY MOUTH AT BEDTIME    Statins Protocol Passed    4/6/2018  4:07 PM       Passed - LDL on file in past 12 months    Recent Labs   Lab Test  03/22/18   0901   LDL  68          Passed - No abnormal creatine kinase in past 12 months    Recent Labs   Lab Test  02/14/17   0921   CKT  35           Passed - Recent (12 mo) or future (30 days) visit within the authorizing provider's specialty    Patient had office visit in the last 12 months or has a visit in the next 30 days with authorizing provider or within the authorizing provider's specialty.  See \"Patient Info\" tab in inbasket, or \"Choose Columns\" in Meds & Orders section of the refill encounter.           Passed - Patient is age 18 or older       Passed - No active pregnancy on record       Passed - No positive pregnancy test in past 12 months          "

## 2018-04-09 RX ORDER — LOVASTATIN 40 MG
TABLET ORAL
Qty: 30 TABLET | Refills: 1 | OUTPATIENT
Start: 2018-04-09

## 2018-05-03 ENCOUNTER — OFFICE VISIT (OUTPATIENT)
Dept: INTERNAL MEDICINE | Facility: CLINIC | Age: 83
End: 2018-05-03
Payer: COMMERCIAL

## 2018-05-03 VITALS
HEIGHT: 59 IN | TEMPERATURE: 98.5 F | SYSTOLIC BLOOD PRESSURE: 130 MMHG | BODY MASS INDEX: 25.6 KG/M2 | RESPIRATION RATE: 16 BRPM | WEIGHT: 127 LBS | HEART RATE: 92 BPM | DIASTOLIC BLOOD PRESSURE: 76 MMHG

## 2018-05-03 DIAGNOSIS — E11.42 TYPE 2 DIABETES MELLITUS WITH DIABETIC POLYNEUROPATHY, WITHOUT LONG-TERM CURRENT USE OF INSULIN (H): Primary | ICD-10-CM

## 2018-05-03 DIAGNOSIS — R60.9 EDEMA, UNSPECIFIED TYPE: ICD-10-CM

## 2018-05-03 DIAGNOSIS — E78.5 HYPERLIPIDEMIA LDL GOAL <100: ICD-10-CM

## 2018-05-03 DIAGNOSIS — I10 ESSENTIAL HYPERTENSION, BENIGN: ICD-10-CM

## 2018-05-03 PROCEDURE — 99214 OFFICE O/P EST MOD 30 MIN: CPT | Performed by: INTERNAL MEDICINE

## 2018-05-03 PROCEDURE — 99207 C FOOT EXAM  NO CHARGE: CPT | Mod: 25 | Performed by: INTERNAL MEDICINE

## 2018-05-03 ASSESSMENT — PAIN SCALES - GENERAL: PAINLEVEL: NO PAIN (0)

## 2018-05-03 NOTE — PATIENT INSTRUCTIONS
Continue current meds  Call  [phone] to schedule a future lab appointment  non-fasting in early September.    Moisturizer to feet as able with pumus stone to callus area or occ pedicures   Check with insurance re: Shingrix vaccine for shingles prevention. Check if covered and if they prefer it to be given in clinic or pharmacy. Schedule nurse only appt if wish to receive in clinic. 2 shot series done 2-6 months apart

## 2018-05-03 NOTE — MR AVS SNAPSHOT
"              After Visit Summary   5/3/2018    Tess Sellers    MRN: [id number]           Patient Information     Date Of Birth          1935        Visit Information        Provider Department      5/3/2018 10:30 AM Venkatesh Cardenas MD [facility]        Care Instructions    Continue current meds  Call  [phone] to schedule a future lab appointment  non-fasting in early September.    Moisturizer to feet as able with pumus stone to callus area or occ pedicures   Check with insurance re: Shingrix vaccine for shingles prevention. Check if covered and if they prefer it to be given in clinic or pharmacy. Schedule nurse only appt if wish to receive in clinic. 2 shot series done 2-6 months apart          Follow-ups after your visit        Who to contact     If you have questions or need follow up information about today's clinic visit or your schedule please contact [facility] directly at [phone].  Normal or non-critical lab and imaging results will be communicated to you by Zokemhart, letter or phone within 4 business days after the clinic has received the results. If you do not hear from us within 7 days, please contact the clinic through Zokemhart or phone. If you have a critical or abnormal lab result, we will notify you by phone as soon as possible.  Submit refill requests through RoyaltyShare or call your pharmacy and they will forward the refill request to us. Please allow 3 business days for your refill to be completed.          Additional Information About Your Visit        Zokemhart Information     RoyaltyShare lets you send messages to your doctor, view your test results, renew your prescriptions, schedule appointments and more. To sign up, go to [email or web address] . Click on \"Log in\" on the left side of the screen, which will take you to the Welcome page. Then click on \"Sign up Now\" on the right side of the page.     You will be asked to enter the access code " "listed below, as well as some personal information. Please follow the directions to create your username and password.     Your access code is: [email or web address]  Expires: 2018 11:14 AM     Your access code will  in 90 days. If you need help or a new code, please call your [facility] clinic or [phone].        Care EveryWhere ID     This is your Care EveryWhere ID. This could be used by other organizations to access your [facility] medical records  [id number]        Your Vitals Were     Pulse Temperature Respirations Height Last Period BMI (Body Mass Index)    92 98.5  F (36.9  C) (Oral) 16 4' 11\" (1.499 m) [birth year]1969 25.65 kg/m2       Blood Pressure from Last 3 Encounters:   18 130/76   17 138/88   10/05/17 (!) 148/95    Weight from Last 3 Encounters:   18 127 lb (57.6 kg)   17 128 lb (58.1 kg)   10/05/17 125 lb (56.7 kg)              Today, you had the following     No orders found for display       Primary Care Provider Office Phone # Fax #    Venkatesh Cardenas [phone]       [address] [place] [address]        Equal Access to Services     [facility] : Hadii ede ku hadasho Soomaali, waaxda luqadaha, qaybta kaalmada adeegyada, waxzelda melgar haytevin garcia . So [facility] [phone].    ATENCIÓN: Si habla español, tiene a hughes disposición servicios gratuitos de asistencia lingüística. Llame al [phone].    We comply with applicable federal civil rights laws and [place] laws. We do not discriminate on the basis of race, color, national origin, age, disability, sex, sexual orientation, or gender identity.            Thank you!     Thank you for choosing [facility]  for your care. Our goal is always to provide you with excellent care. Hearing back from our patients is one way we can continue to improve our services. Please take a few minutes to complete the written survey that you may receive in the mail after your visit with " us. Thank you!             Your Updated Medication List - Protect others around you: Learn how to safely use, store and throw away your medicines at www.disposemymeds.org.          This list is accurate as of 5/3/18 11:14 AM.  Always use your most recent med list.                   Brand Name Dispense Instructions for use Diagnosis    acetaminophen 325 MG tablet    TYLENOL    100 tablet    Take 2 tablets (650 mg) by mouth every 4 hours as needed for other (mild pain)    Post-operative state       ASPIRIN NOT PRESCRIBED    INTENTIONAL    0    due to dyspepsia, reflux    Type II or unspecified type diabetes mellitus without mention of complication, not stated as uncontrolled       furosemide 20 MG tablet    LASIX    38 tablet    TAKE ONE TABLET BY MOUTH THREE TIMES PER WEEK.  (MONDAY, WEDNESDAY, AND FRIDAY.)    Edema, unspecified type       gabapentin 100 MG capsule    NEURONTIN    90 capsule    1-3 capsules at bedtime    Lumbar radiculopathy       glipiZIDE 5 MG tablet    GLUCOTROL    180 tablet    TAKE ONE TABLET BY MOUTH TWICE DAILY BEFORE MEALS    Type 2 diabetes mellitus with diabetic polyneuropathy, without long-term current use of insulin (H)       losartan 100 MG tablet    COZAAR    90 tablet    TAKE ONE TABLET BY MOUTH ONE TIME DAILY    Essential hypertension, benign       lovastatin 40 MG tablet    MEVACOR    30 tablet    TAKE 1 TABLET (40MG) BY MOUTH AT BEDTIME    Hyperlipidemia LDL goal <100       metFORMIN 1000 MG tablet    GLUCOPHAGE    180 tablet    TAKE ONE TABLET BY MOUTH TWICE DAILY    Type 2 diabetes mellitus with diabetic polyneuropathy, without long-term current use of insulin (H)       Multi-vitamin Tabs tablet   Generic drug:  multivitamin, therapeutic with minerals     0    1 TABLET DAILY    Osteoporosis, unspecified       ONETOUCH ULTRA test strip   Generic drug:  blood glucose monitoring     50 each    CHECK BLOOD SUGAR ONCE TO TWICE DAILY    Type 2 diabetes mellitus with diabetic  polyneuropathy, without long-term current use of insulin (H)       pyridoxine 100 MG tablet    VITAMIN B-6    0    1 x daily    Breast tenderness

## 2018-05-03 NOTE — PROGRESS NOTES
SUBJECTIVE:   Tess Sellers is a 83 year old female who presents to clinic today for the following health issues:  Chief Complaint   Patient presents with     Diabetes     Hypertension     Diabetes Follow-up    Patient is checking blood sugars: twice daily.    Blood sugar testing frequency justification: Uncontrolled diabetes  Results are as follows:         am - 90's         bedtime - 180's    Diabetic concerns: None     Symptoms of hypoglycemia (low blood sugar): none     Paresthesias (numbness or burning in feet) or sores: No     Date of last diabetic eye exam: 11/28/2017    BP Readings from Last 2 Encounters:   11/09/17 138/88   10/05/17 (!) 148/95     Hemoglobin A1C (%)   Date Value   03/22/2018 7.2 (H)   10/25/2017 7.6 (H)     LDL Cholesterol Calculated (mg/dL)   Date Value   03/22/2018 68   02/14/2017 96     Hypertension Follow-up      Outpatient blood pressures are not being checked.    Low Salt Diet: no added salt      Amount of exercise or physical activity: 4-5 days/week for an average of 45-60 minutes, Patient does yard work in the Spring and Summer    Problems taking medications regularly: No    Medication side effects: none    Diet: regular (no restrictions) and no salt      Pt's past medical history, family history, habits, medications and allergies were reviewed with the patient today.  See snap shot for  HCM status. Most recent lab results reviewed with pt. Problem list and histories reviewed & adjusted, as indicated.  Additional history as below:     Denies CP, SOB, abdominal pain, polyuria, polydipsia, vision changes, extremity numbness/parasthesias or skin problems. Stable edema control with Lasix. No orthopnea, PND    Lab Results   Component Value Date     03/22/2018     Lab Results   Component Value Date    A1C 7.2 03/22/2018     Lab Results   Component Value Date    CHOL 160 03/22/2018     Lab Results   Component Value Date    LDL 68 03/22/2018     Lab Results   Component Value Date  "   HDL 60 03/22/2018     Lab Results   Component Value Date    TRIG 162 03/22/2018     Lab Results   Component Value Date    CR 1.03 03/22/2018     Lab Results   Component Value Date    ALT 51 03/22/2018     Lab Results   Component Value Date    AST 31 03/22/2018     Lab Results   Component Value Date    MICROL 25 03/22/2018     Lab Results   Component Value Date    TSH 1.69 02/14/2017       Additional ROS:   Constitutional, HEENT, Cardiovascular, Pulmonary, GI and , Neuro, MSK and Psych review of systems/symptoms are otherwise negative or unchanged from previous, except as noted above.      OBJECTIVE:  /76  Pulse 92  Temp 98.5  F (36.9  C) (Oral)  Resp 16  Ht 4' 11\" (1.499 m)  Wt 127 lb (57.6 kg)  LMP 10/03/1969  BMI 25.65 kg/m2   Estimated body mass index is 25.65 kg/(m^2) as calculated from the following:    Height as of this encounter: 4' 11\" (1.499 m).    Weight as of this encounter: 127 lb (57.6 kg).    Neck: no adenopathy. Thyroid normal to palpation. No bruits  Pulm: Lungs clear to auscultation   CV: Regular rates and rhythm  GI: Soft, nontender, Normal active bowel sounds, No hepatosplenomegaly or masses palpable  Ext: Peripheral pulses intact. Minimal BLE edema. Skin of feet mildly dry with some early thin  callus formation bilateral heels  Neuro: Normal strength and tone, sensory exam grossly normal    Assessment/Plan: (See plan discussion below for further details)  1. Type 2 diabetes mellitus with diabetic polyneuropathy, without long-term current use of insulin (H)  Controlled for age.  Continue current medication.  Refills done.  Repeat labs 6 months  - Hemoglobin A1c; Future  - Basic metabolic panel; Future  - FOOT EXAM  - glipiZIDE (GLUCOTROL) 5 MG tablet; TAKE ONE TABLET BY MOUTH TWICE DAILY BEFORE MEALS  Dispense: 180 tablet; Refill: 3  - metFORMIN (GLUCOPHAGE) 1000 MG tablet; TAKE ONE TABLET BY MOUTH TWICE DAILY  Dispense: 180 tablet; Refill: 3    2. Edema, unspecified " type  Controlled.  Continue Lasix.  Repeat labs 6 months  - Basic metabolic panel; Future    3. Hyperlipidemia LDL goal <100  Controlled.  Continue current medication.  Repeat labs one year  - Lipid panel reflex to direct LDL Fasting; Future  - Comprehensive metabolic panel; Future  - lovastatin (MEVACOR) 40 MG tablet; TAKE 1 TABLET (40MG) BY MOUTH AT BEDTIME  Dispense: 90 tablet; Refill: 3    4. BENIGN HYPERTENSION  Controlled.  Continue current medication  - losartan (COZAAR) 100 MG tablet; Take 1 tablet (100 mg) by mouth daily  Dispense: 90 tablet; Refill: 3      Plan discussion:  Continue current meds. RFs done  Call  [phone] to schedule a future lab appointment  non-fasting in early September.    Moisturizer to feet as able with pumus stone to callus area or occ pedicures   Check with insurance re: Shingrix vaccine for shingles prevention. Check if covered and if they prefer it to be given in clinic or pharmacy. Schedule nurse only appt if wish to receive in clinic. 2 shot series done 2-6 months apart          Venkatesh Cardenas MD  Internal Medicine Department  [facility]

## 2018-05-06 RX ORDER — LOVASTATIN 40 MG
TABLET ORAL
Qty: 90 TABLET | Refills: 3 | Status: SHIPPED | OUTPATIENT
Start: 2018-05-06 | End: 2019-05-27

## 2018-05-06 RX ORDER — LOSARTAN POTASSIUM 100 MG/1
100 TABLET ORAL DAILY
Qty: 90 TABLET | Refills: 3 | Status: SHIPPED | OUTPATIENT
Start: 2018-05-06 | End: 2019-06-06

## 2018-05-06 RX ORDER — GLIPIZIDE 5 MG/1
TABLET ORAL
Qty: 180 TABLET | Refills: 3 | Status: SHIPPED | OUTPATIENT
Start: 2018-05-06 | End: 2019-07-17

## 2018-05-26 DIAGNOSIS — E11.42 TYPE 2 DIABETES MELLITUS WITH DIABETIC POLYNEUROPATHY, WITHOUT LONG-TERM CURRENT USE OF INSULIN (H): ICD-10-CM

## 2018-05-26 NOTE — TELEPHONE ENCOUNTER
"Requested Prescriptions   Pending Prescriptions Disp Refills     ONETOUCH ULTRA test strip [Pharmacy Med Name: OneTouch Ultra Blue In Vitro Strip]  Last Written Prescription Date:  11/16/17  Last Fill Quantity: 50 EACH,  # refills: 5   Last office visit: 5/3/2018 with prescribing provider:  ELI   Future Office Visit:     50 each 4     Sig: CHECK BLOOD SUGAR ONCE TO TWICE DAILY    Diabetic Supplies Protocol Passed    5/26/2018  7:00 AM       Passed - Patient is 18 years of age or older       Passed - Recent (6 mo) or future (30 days) visit within the authorizing provider's specialty    Patient had office visit in the last 6 months or has a visit in the next 30 days with authorizing provider.  See \"Patient Info\" tab in inbasket, or \"Choose Columns\" in Meds & Orders section of the refill encounter.              "

## 2018-08-05 DIAGNOSIS — M54.16 LUMBAR RADICULOPATHY: ICD-10-CM

## 2018-08-05 NOTE — TELEPHONE ENCOUNTER
Requested Prescriptions   Pending Prescriptions Disp Refills     gabapentin (NEURONTIN) 100 MG capsule [Pharmacy Med Name: Gabapentin Oral Capsule 100 MG] 90 capsule 10     Sig: TAKE 1-3 CAPSULES BY MOUTH AT BEDTIME    There is no refill protocol information for this order        Last Written Prescription Date:  7/18/2017  Last Fill Quantity: 90,  # refills: 11   Last office visit: 5/3/2018 with prescribing provider:  5/3/2018   Future Office Visit:

## 2018-08-07 RX ORDER — GABAPENTIN 100 MG/1
CAPSULE ORAL
Qty: 90 CAPSULE | Refills: 10 | Status: SHIPPED | OUTPATIENT
Start: 2018-08-07 | End: 2019-08-13

## 2018-10-02 DIAGNOSIS — R60.9 EDEMA, UNSPECIFIED TYPE: ICD-10-CM

## 2018-10-02 RX ORDER — FUROSEMIDE 20 MG
TABLET ORAL
Qty: 38 TABLET | Refills: 1 | Status: SHIPPED | OUTPATIENT
Start: 2018-10-02 | End: 2019-06-18

## 2018-10-02 NOTE — TELEPHONE ENCOUNTER
"Requested Prescriptions   Pending Prescriptions Disp Refills     furosemide (LASIX) 20 MG tablet [Pharmacy Med Name: Furosemide Oral Tablet 20 MG] 38 tablet 0     Sig: TAKE ONE TABLET BY MOUTH THREE TIMES PER WEEK.  (MONDAY, WEDNESDAY, AND FRIDAY.)    Diuretics (Including Combos) Protocol Passed    10/2/2018  7:00 AM       Passed - Blood pressure under 140/90 in past 12 months    BP Readings from Last 3 Encounters:   05/03/18 130/76   11/09/17 138/88   10/05/17 (!) 148/95                Passed - Recent (12 mo) or future (30 days) visit within the authorizing provider's specialty    Patient had office visit in the last 12 months or has a visit in the next 30 days with authorizing provider or within the authorizing provider's specialty.  See \"Patient Info\" tab in inbasket, or \"Choose Columns\" in Meds & Orders section of the refill encounter.           Passed - Patient is age 18 or older       Passed - No active pregancy on record       Passed - Normal serum creatinine on file in past 12 months    Recent Labs   Lab Test  03/22/18   0901   CR  1.03             Passed - Normal serum potassium on file in past 12 months    Recent Labs   Lab Test  03/22/18   0901   POTASSIUM  4.1                   Passed - Normal serum sodium on file in past 12 months    Recent Labs   Lab Test  03/22/18   0901   NA  144             Passed - No positive pregnancy test in past 12 months        Prescription approved per [facility] [place].    "

## 2018-10-09 DIAGNOSIS — R60.9 EDEMA, UNSPECIFIED TYPE: ICD-10-CM

## 2018-10-09 DIAGNOSIS — E11.42 TYPE 2 DIABETES MELLITUS WITH DIABETIC POLYNEUROPATHY, WITHOUT LONG-TERM CURRENT USE OF INSULIN (H): ICD-10-CM

## 2018-10-09 LAB
ANION GAP SERPL CALCULATED.3IONS-SCNC: 8 MMOL/L (ref 3–14)
BUN SERPL-MCNC: 35 MG/DL (ref 7–30)
CALCIUM SERPL-MCNC: 9.7 MG/DL (ref 8.5–10.1)
CHLORIDE SERPL-SCNC: 109 MMOL/L (ref 94–109)
CO2 SERPL-SCNC: 24 MMOL/L (ref 20–32)
CREAT SERPL-MCNC: 1.15 MG/DL (ref 0.52–1.04)
GFR SERPL CREATININE-BSD FRML MDRD: 45 ML/MIN/1.7M2
GLUCOSE SERPL-MCNC: 149 MG/DL (ref 70–99)
HBA1C MFR BLD: 7.6 % (ref 0–5.6)
POTASSIUM SERPL-SCNC: 4.6 MMOL/L (ref 3.4–5.3)
SODIUM SERPL-SCNC: 141 MMOL/L (ref 133–144)

## 2018-10-09 PROCEDURE — 80048 BASIC METABOLIC PNL TOTAL CA: CPT | Performed by: INTERNAL MEDICINE

## 2018-10-09 PROCEDURE — 83036 HEMOGLOBIN GLYCOSYLATED A1C: CPT | Performed by: INTERNAL MEDICINE

## 2018-10-09 PROCEDURE — 36415 COLL VENOUS BLD VENIPUNCTURE: CPT | Performed by: INTERNAL MEDICINE

## 2018-10-09 NOTE — LETTER
"[address] [address]  [phone]      10/10/2018       Tess Sellers  [address] [facility] [address]        Dear Tess,  Here are your most recent lab results. Unless commented on below, mild variation of results  outside the normal range are not clinically signicant.    Resulted Orders   Hemoglobin A1c   Result Value Ref Range    Hemoglobin A1C 7.6 (H) 0 - 5.6 %      Comment:      Normal <5.7% Prediabetes 5.7-6.4%  Diabetes 6.5% or higher - adopted from ADA   consensus guidelines.     Basic metabolic panel   Result Value Ref Range    Sodium 141 133 - 144 mmol/L    Potassium 4.6 3.4 - 5.3 mmol/L    Chloride 109 94 - 109 mmol/L    Carbon Dioxide 24 20 - 32 mmol/L    Anion Gap 8 3 - 14 mmol/L    Glucose 149 (H) 70 - 99 mg/dL    Urea Nitrogen 35 (H) 7 - 30 mg/dL    Creatinine 1.15 (H) 0.52 - 1.04 mg/dL    GFR Estimate 45 (L) >60 mL/min/1.7m2      Comment:      Non  GFR Calc    GFR Estimate If Black 54 (L) >60 mL/min/1.7m2      Comment:       GFR Calc    Calcium 9.7 8.5 - 10.1 mg/dL       Electrolyte and nonfasting blood sugar) lab results were normal The \"normal\" reference range listed above for blood sugar is nonfasting  Kidney function lab results were abnormal.   A1C lab result (which assesses your average blood sugar control for the past 3 months) was stable and overall showed good control of your diabetes for your age.  The abnormal kidney function appears to be related to mild dehydration.  Reduce your use of Furosemide to 1 tab twice a week (Monday and Friday). Stop taking the Furosemide on Wednesdays  Call our appointment desk at [phone] to schedule a \"lab only\" to have your Basic Metabolic Panel rechecked non-fasting in 1 month.  Please make an appointment to see me  a few days after the lab test is drawn to discuss results and follow-up on your leg swelling/edema with the medication dose " reduction and other  medical issues or earlier as needed.  Continue other medications are you are    If you have further questions/concerns regarding the results, I would ask that you bring them to your next follow-up appointment with me and I would be happy to review them with you further.      Sincerely,      Venkatesh Cardenas MD  Internal Medicine

## 2018-10-23 ENCOUNTER — TRANSFERRED RECORDS (OUTPATIENT)
Dept: HEALTH INFORMATION MANAGEMENT | Facility: CLINIC | Age: 83
End: 2018-10-23

## 2018-11-03 DIAGNOSIS — E78.5 HYPERLIPIDEMIA LDL GOAL <100: ICD-10-CM

## 2018-11-03 NOTE — TELEPHONE ENCOUNTER
"Requested Prescriptions   Pending Prescriptions Disp Refills     lovastatin (MEVACOR) 40 MG tablet [Pharmacy Med Name: Lovastatin Oral Tablet 40 MG] 30 tablet 5    Last Written Prescription Date:  5/6/2018  Last Fill Quantity: 90,  # refills: 3   Last office visit: 5/3/2018 with prescribing provider:  5/3/2018   Future Office Visit:     Sig: TAKE 1 TABLET (40MG) BY MOUTH AT BEDTIME    Statins Protocol Passed    11/3/2018  7:00 AM       Passed - LDL on file in past 12 months    Recent Labs   Lab Test  03/22/18   0901   LDL  68            Passed - No abnormal creatine kinase in past 12 months    Recent Labs   Lab Test  02/14/17   0921   CKT  35               Passed - Recent (12 mo) or future (30 days) visit within the authorizing provider's specialty    Patient had office visit in the last 12 months or has a visit in the next 30 days with authorizing provider or within the authorizing provider's specialty.  See \"Patient Info\" tab in inbasket, or \"Choose Columns\" in Meds & Orders section of the refill encounter.             Passed - Patient is age 18 or older       Passed - No active pregnancy on record       Passed - No positive pregnancy test in past 12 months          "

## 2018-11-06 RX ORDER — LOVASTATIN 40 MG
TABLET ORAL
Qty: 30 TABLET | Refills: 5 | OUTPATIENT
Start: 2018-11-06

## 2018-11-13 DIAGNOSIS — E78.5 HYPERLIPIDEMIA LDL GOAL <100: ICD-10-CM

## 2018-11-13 RX ORDER — LOVASTATIN 40 MG
TABLET ORAL
Qty: 30 TABLET | Refills: 5 | OUTPATIENT
Start: 2018-11-13

## 2019-04-19 ENCOUNTER — TELEPHONE (OUTPATIENT)
Dept: INTERNAL MEDICINE | Facility: CLINIC | Age: 84
End: 2019-04-19

## 2019-04-19 NOTE — TELEPHONE ENCOUNTER
Reason for Call:  Same Day Appointment, Requested Provider:      PCP: Venkatesh Cardenas    Reason for visit: UC follow up for pneumonia    Duration of symptoms:     Have you been treated for this in the past? Yes    Additional comments: Pt went to an UC in  4/18/19 and was diagnosed with pneumonia.  She wants to see Dr Cardenas 4/22/19 as a follow up to the UC visit.  She will only see Dr Cardenas.  Please call pt back.    Can we leave a detailed message on this number? YES    Phone number patient can be reached at: Home number on file [phone] (home)    Best Time: anytime    Call taken on 4/19/2019 at 9:57 AM by ABBY BERMUDEZ

## 2019-04-19 NOTE — TELEPHONE ENCOUNTER
Pt called back and scheduled with Dr Calvo on 4/22/19 at 11am.  She still would prefer to see Dr Cardenas.  Please call her back.

## 2019-04-22 ENCOUNTER — OFFICE VISIT (OUTPATIENT)
Dept: INTERNAL MEDICINE | Facility: CLINIC | Age: 84
End: 2019-04-22
Payer: COMMERCIAL

## 2019-04-22 DIAGNOSIS — J18.9 PNEUMONIA OF RIGHT LOWER LOBE DUE TO INFECTIOUS ORGANISM: Primary | ICD-10-CM

## 2019-04-22 DIAGNOSIS — E11.42 TYPE 2 DIABETES MELLITUS WITH DIABETIC POLYNEUROPATHY, WITHOUT LONG-TERM CURRENT USE OF INSULIN (H): ICD-10-CM

## 2019-04-22 PROCEDURE — 99214 OFFICE O/P EST MOD 30 MIN: CPT | Performed by: INTERNAL MEDICINE

## 2019-04-22 RX ORDER — BENZONATATE 100 MG/1
100 CAPSULE ORAL
COMMUNITY
Start: 2019-04-18 | End: 2019-04-25

## 2019-04-22 RX ORDER — AZITHROMYCIN 250 MG/1
TABLET, FILM COATED ORAL
COMMUNITY
Start: 2019-04-18 | End: 2019-04-22

## 2019-04-22 RX ORDER — CEFUROXIME AXETIL 500 MG/1
500 TABLET ORAL 2 TIMES DAILY
Qty: 20 TABLET | Refills: 0 | Status: SHIPPED | OUTPATIENT
Start: 2019-04-22 | End: 2019-07-11

## 2019-04-22 ASSESSMENT — MIFFLIN-ST. JEOR: SCORE: 909.02

## 2019-04-22 NOTE — PATIENT INSTRUCTIONS
Cefuroxime 500mg tab, 1 tab twice a day for 10 days (antibiotic)  OTC Mucinex Extended release (ER) 600mg tab, 1 tab twice a day as needed for productive rattle cugh   may use Benzonatate every 8 hrs as need for dry cough or cough at night to suppress so can sleep better   Continue nutrition and hydration  Arm and leg exercises for strengthening  Repeat Chest Xray and fasting labs  in 1 week. For fasting labs, please refrain from eating for 8 hours or more.  Be sure to  drink water and take your  medications the day of the test.   If not quite improved in 10 days or worsen, then contact clinic

## 2019-04-22 NOTE — PROGRESS NOTES
"  SUBJECTIVE:   Tess Sellers is a 84 year old female who presents to clinic today for the following   health issues:    Chief Complaint   Patient presents with     Follow Up     Urgent Care Visit       ED/ Followup:    Facility:  [facility] ([facility])  Date of visit: 04/18/2019  Reason for visit: Pneumonia  Current Status: Stable, still has cough     Pt's past medical history, family history, habits, medications and allergies were reviewed with the patient today.  See snap shot for  HCM status. Most recent lab results reviewed with pt. Problem list and histories reviewed & adjusted, as indicated.  Additional history as below:    Records reviewed.  Patient treated with   azithromycin for right lower lobe pneumonia.  Denies fevers or chills now.  Still has productive rattle cough which is occasionally yellow and change.  Breathing better but still slight shortness of breath.  Appetite has improved.  Drinking fluids okay.  Getting around her home to do ADLs okay though still slightly weakened compared to baseline.  No falls.  Oxygenation okay.   History of diabetes.  Has not been checking blood sugars recently.  Blood sugars previously at goal     Additional ROS:   Constitutional, HEENT, Cardiovascular, Pulmonary, GI and , Neuro, MSK and Psych review of systems/symptoms are otherwise negative or unchanged from previous, except as noted above.      OBJECTIVE:  /74   Pulse 96   Temp 97.8  F (36.6  C) (Oral)   Resp 16   Ht 1.499 m (4' 11\")   Wt 55.3 kg (122 lb)   LMP 10/03/1969   SpO2 98%   BMI 24.64 kg/m     Estimated body mass index is 24.64 kg/m  as calculated from the following:    Height as of this encounter: 1.499 m (4' 11\").    Weight as of this encounter: 55.3 kg (122 lb).     HENT: ear canals and TM's normal and nose and mouth without ulcers or lesions   Neck: no adenopathy. Thyroid normal to palpation. No bruits  Pulm: Lungs clear to auscultation except for mild rhonchi right lung " base.  No wheezes.  No accessory muscle use  CV: Regular rates and rhythm  GI: Soft, nontender, Normal active bowel sounds, No hepatosplenomegaly or masses palpable  Ext: Peripheral pulses intact. No edema.  Neuro: Normal strength and tone, sensory exam mildly reduced light touch sensation distal bilateral lower extremities (chronic).  Stable gait    Assessment/Plan: (See plan discussion below for further details)  1. Pneumonia of right lower lobe due to infectious organism (H)  Improved but still significant rattle cough.  Therefore broaden antibody coverage by adding Ceftin.  Repeat chest x-ray 1 week  - XR Chest 2 Views; Future  - cefuroxime (CEFTIN) 500 MG tablet; Take 1 tablet (500 mg) by mouth 2 times daily for 10 days  Dispense: 20 tablet; Refill: 0    2. Type 2 diabetes mellitus with diabetic polyneuropathy, without long-term current use of insulin (H)  Previously controlled.  Continue current therapy and repeat labs in 1 weeks briefly ordered    Plan discussion:   Cefuroxime 500mg tab, 1 tab twice a day for 10 days (antibiotic)  OTC Mucinex Extended release (ER) 600mg tab, 1 tab twice a day as needed for productive rattle cugh   may use Benzonatate every 8 hrs as need for dry cough or cough at night to suppress so can sleep better   Continue nutrition and hydration  Arm and leg exercises for strengthening  Repeat Chest Xray and fasting labs  in 1 week. For fasting labs, please refrain from eating for 8 hours or more.  Be sure to  drink water and take your  medications the day of the test.   If not quite improved in 10 days or worsen, then contact clinic       Venkatesh Cardenas MD  Internal Medicine Department  [facility]

## 2019-04-24 VITALS
DIASTOLIC BLOOD PRESSURE: 74 MMHG | HEIGHT: 59 IN | HEART RATE: 96 BPM | WEIGHT: 122 LBS | OXYGEN SATURATION: 98 % | BODY MASS INDEX: 24.6 KG/M2 | TEMPERATURE: 97.8 F | SYSTOLIC BLOOD PRESSURE: 128 MMHG | RESPIRATION RATE: 16 BRPM

## 2019-04-25 ENCOUNTER — DOCUMENTATION ONLY (OUTPATIENT)
Dept: LAB | Facility: CLINIC | Age: 84
End: 2019-04-25

## 2019-04-25 DIAGNOSIS — E11.42 TYPE 2 DIABETES MELLITUS WITH DIABETIC POLYNEUROPATHY, WITHOUT LONG-TERM CURRENT USE OF INSULIN (H): Primary | ICD-10-CM

## 2019-04-30 ENCOUNTER — ANCILLARY PROCEDURE (OUTPATIENT)
Dept: GENERAL RADIOLOGY | Facility: CLINIC | Age: 84
End: 2019-04-30
Attending: INTERNAL MEDICINE
Payer: COMMERCIAL

## 2019-04-30 DIAGNOSIS — E78.5 HYPERLIPIDEMIA LDL GOAL <100: ICD-10-CM

## 2019-04-30 DIAGNOSIS — J18.9 PNEUMONIA OF RIGHT LOWER LOBE DUE TO INFECTIOUS ORGANISM: ICD-10-CM

## 2019-04-30 DIAGNOSIS — E11.42 TYPE 2 DIABETES MELLITUS WITH DIABETIC POLYNEUROPATHY, WITHOUT LONG-TERM CURRENT USE OF INSULIN (H): ICD-10-CM

## 2019-04-30 LAB
ALBUMIN SERPL-MCNC: 3.4 G/DL (ref 3.4–5)
ALP SERPL-CCNC: 80 U/L (ref 40–150)
ALT SERPL W P-5'-P-CCNC: 31 U/L (ref 0–50)
ANION GAP SERPL CALCULATED.3IONS-SCNC: 7 MMOL/L (ref 3–14)
AST SERPL W P-5'-P-CCNC: 19 U/L (ref 0–45)
BILIRUB SERPL-MCNC: 0.3 MG/DL (ref 0.2–1.3)
BUN SERPL-MCNC: 34 MG/DL (ref 7–30)
CALCIUM SERPL-MCNC: 9.7 MG/DL (ref 8.5–10.1)
CHLORIDE SERPL-SCNC: 105 MMOL/L (ref 94–109)
CHOLEST SERPL-MCNC: 146 MG/DL
CO2 SERPL-SCNC: 25 MMOL/L (ref 20–32)
CREAT SERPL-MCNC: 1.04 MG/DL (ref 0.52–1.04)
GFR SERPL CREATININE-BSD FRML MDRD: 49 ML/MIN/{1.73_M2}
GLUCOSE SERPL-MCNC: 135 MG/DL (ref 70–99)
HBA1C MFR BLD: 7.5 % (ref 0–5.6)
HDLC SERPL-MCNC: 56 MG/DL
LDLC SERPL CALC-MCNC: 63 MG/DL
NONHDLC SERPL-MCNC: 90 MG/DL
POTASSIUM SERPL-SCNC: 4.4 MMOL/L (ref 3.4–5.3)
PROT SERPL-MCNC: 6.7 G/DL (ref 6.8–8.8)
SODIUM SERPL-SCNC: 137 MMOL/L (ref 133–144)
TRIGL SERPL-MCNC: 133 MG/DL
TSH SERPL DL<=0.005 MIU/L-ACNC: 2.29 MU/L (ref 0.4–4)

## 2019-04-30 PROCEDURE — 80061 LIPID PANEL: CPT | Performed by: INTERNAL MEDICINE

## 2019-04-30 PROCEDURE — 71046 X-RAY EXAM CHEST 2 VIEWS: CPT

## 2019-04-30 PROCEDURE — 36415 COLL VENOUS BLD VENIPUNCTURE: CPT | Performed by: INTERNAL MEDICINE

## 2019-04-30 PROCEDURE — 84443 ASSAY THYROID STIM HORMONE: CPT | Performed by: INTERNAL MEDICINE

## 2019-04-30 PROCEDURE — 80053 COMPREHEN METABOLIC PANEL: CPT | Performed by: INTERNAL MEDICINE

## 2019-04-30 PROCEDURE — 83036 HEMOGLOBIN GLYCOSYLATED A1C: CPT | Performed by: INTERNAL MEDICINE

## 2019-05-01 DIAGNOSIS — E11.42 TYPE 2 DIABETES MELLITUS WITH DIABETIC POLYNEUROPATHY, WITHOUT LONG-TERM CURRENT USE OF INSULIN (H): ICD-10-CM

## 2019-05-01 NOTE — TELEPHONE ENCOUNTER
"Requested Prescriptions   Pending Prescriptions Disp Refills     ONETOUCH ULTRA test strip [Pharmacy Med Name: OneTouch Ultra Blue In Vitro Strip] 50 each 4     Sig: CHECK BLOOD SUGAR ONCE TO TWICE DAILY       Diabetic Supplies Protocol Passed - 5/1/2019 11:13 AM        Passed - Medication is active on med list        Passed - Patient is 18 years of age or older        Passed - Recent (6 mo) or future (30 days) visit within the authorizing provider's specialty     Patient had office visit in the last 6 months or has a visit in the next 30 days with authorizing provider.  See \"Patient Info\" tab in inbasket, or \"Choose Columns\" in Meds & Orders section of the refill encounter.              "

## 2019-05-27 DIAGNOSIS — E78.5 HYPERLIPIDEMIA LDL GOAL <100: ICD-10-CM

## 2019-05-28 NOTE — TELEPHONE ENCOUNTER
"Requested Prescriptions   Pending Prescriptions Disp Refills     lovastatin (MEVACOR) 40 MG tablet [Pharmacy Med Name: Lovastatin Oral Tablet 40 MG] 90 tablet 2     Sig: TAKE 1 TABLET (40MG) BY MOUTH AT BEDTIME       Statins Protocol Passed - 5/27/2019  1:15 PM        Passed - LDL on file in past 12 months     Recent Labs   Lab Test 04/30/19  0724   LDL 63             Passed - No abnormal creatine kinase in past 12 months     Recent Labs   Lab Test 02/14/17  0921   CKT 35                Passed - Recent (12 mo) or future (30 days) visit within the authorizing provider's specialty     Patient had office visit in the last 12 months or has a visit in the next 30 days with authorizing provider or within the authorizing provider's specialty.  See \"Patient Info\" tab in inbasket, or \"Choose Columns\" in Meds & Orders section of the refill encounter.              Passed - Medication is active on med list        Passed - Patient is age 18 or older        Passed - No active pregnancy on record        Passed - No positive pregnancy test in past 12 months        Last Written Prescription Date:  5/6/18  Last Fill Quantity: 90,  # refills: 3   Last office visit: 4/22/2019 with prescribing provider:  4/22/19   Future Office Visit:      "

## 2019-05-29 RX ORDER — LOVASTATIN 40 MG
TABLET ORAL
Qty: 90 TABLET | Refills: 2 | Status: SHIPPED | OUTPATIENT
Start: 2019-05-29 | End: 2020-03-03

## 2019-06-06 DIAGNOSIS — I10 ESSENTIAL HYPERTENSION, BENIGN: ICD-10-CM

## 2019-06-06 RX ORDER — LOSARTAN POTASSIUM 100 MG/1
100 TABLET ORAL DAILY
Qty: 90 TABLET | Refills: 2 | Status: SHIPPED | OUTPATIENT
Start: 2019-06-06 | End: 2020-03-17

## 2019-06-06 NOTE — TELEPHONE ENCOUNTER
"Requested Prescriptions   Pending Prescriptions Disp Refills     losartan (COZAAR) 100 MG tablet [Pharmacy Med Name: Losartan Potassium Oral Tablet 100 MG] 90 tablet 2     Sig: Take 1 tablet (100 mg) by mouth daily       Angiotensin-II Receptors Passed - 6/6/2019  9:24 AM        Passed - Blood pressure under 140/90 in past 12 months     BP Readings from Last 3 Encounters:   04/22/19 128/74   05/03/18 130/76   11/09/17 138/88                 Passed - Recent (12 mo) or future (30 days) visit within the authorizing provider's specialty     Patient had office visit in the last 12 months or has a visit in the next 30 days with authorizing provider or within the authorizing provider's specialty.  See \"Patient Info\" tab in inbasket, or \"Choose Columns\" in Meds & Orders section of the refill encounter.              Passed - Medication is active on med list        Passed - Patient is age 18 or older        Passed - No active pregnancy on record        Passed - Normal serum creatinine on file in past 12 months     Recent Labs   Lab Test 04/30/19  0724   CR 1.04             Passed - Normal serum potassium on file in past 12 months     Recent Labs   Lab Test 04/30/19  0724   POTASSIUM 4.4                    Passed - No positive pregnancy test in past 12 months          "

## 2019-06-18 DIAGNOSIS — R60.9 EDEMA, UNSPECIFIED TYPE: ICD-10-CM

## 2019-06-18 RX ORDER — FUROSEMIDE 20 MG
TABLET ORAL
Qty: 38 TABLET | Refills: 2 | Status: SHIPPED | OUTPATIENT
Start: 2019-06-18 | End: 2020-03-03

## 2019-06-18 NOTE — TELEPHONE ENCOUNTER
"Requested Prescriptions   Pending Prescriptions Disp Refills     furosemide (LASIX) 20 MG tablet [Pharmacy Med Name: Furosemide Oral Tablet 20 MG] 38 tablet 0     Sig: TAKE ONE TABLET BY MOUTH THREE TIMES PER WEEK.  (MONDAY, WEDNESDAY, AND FRIDAY.)       Diuretics (Including Combos) Protocol Passed - 6/18/2019 11:32 AM        Passed - Blood pressure under 140/90 in past 12 months     BP Readings from Last 3 Encounters:   04/22/19 128/74   05/03/18 130/76   11/09/17 138/88                 Passed - Recent (12 mo) or future (30 days) visit within the authorizing provider's specialty     Patient had office visit in the last 12 months or has a visit in the next 30 days with authorizing provider or within the authorizing provider's specialty.  See \"Patient Info\" tab in inbasket, or \"Choose Columns\" in Meds & Orders section of the refill encounter.              Passed - Medication is active on med list        Passed - Patient is age 18 or older        Passed - No active pregancy on record        Passed - Normal serum creatinine on file in past 12 months     Recent Labs   Lab Test 04/30/19  0724   CR 1.04              Passed - Normal serum potassium on file in past 12 months     Recent Labs   Lab Test 04/30/19  0724   POTASSIUM 4.4                    Passed - Normal serum sodium on file in past 12 months     Recent Labs   Lab Test 04/30/19  0724                 Passed - No positive pregnancy test in past 12 months        Last Written Prescription Date:  10/02/2018  Last Fill Quantity: 38,  # refills: 1   Last office visit: 4/22/2019 with prescribing provider:  Dr. Cardenas   Future Office Visit:      "

## 2019-07-11 ENCOUNTER — OFFICE VISIT (OUTPATIENT)
Dept: URGENT CARE | Facility: URGENT CARE | Age: 84
End: 2019-07-11
Payer: COMMERCIAL

## 2019-07-11 VITALS
DIASTOLIC BLOOD PRESSURE: 78 MMHG | BODY MASS INDEX: 24.28 KG/M2 | WEIGHT: 120.2 LBS | TEMPERATURE: 98.7 F | HEART RATE: 83 BPM | SYSTOLIC BLOOD PRESSURE: 124 MMHG | OXYGEN SATURATION: 98 %
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
## 2019-07-11 DIAGNOSIS — S70.369A INSECT BITE OF THIGH, UNSPECIFIED LATERALITY, INITIAL ENCOUNTER: Primary | ICD-10-CM

## 2019-07-11 PROCEDURE — 99213 OFFICE O/P EST LOW 20 MIN: CPT | Performed by: FAMILY MEDICINE

## 2019-07-11 RX ORDER — TRIAMCINOLONE ACETONIDE 1 MG/G
CREAM TOPICAL 2 TIMES DAILY
Qty: 80 G | Refills: 1 | Status: SHIPPED | OUTPATIENT
Start: 2019-07-11 | End: 2021-08-24

## 2019-07-12 NOTE — PROGRESS NOTES
CHIEF COMPLAINT:   Chief Complaint   Patient presents with     Urgent Care     unknown bites,in groin area and legs. burning on bites.      SUBJECTIVE:  Tess Sellers is a 84 year old female who presents to the clinic today for a rash.  Onset of rash was 2 day(s) ago.   Rash is gradual onset and still present.  Location of the rash: groin and thigh.  Quality/symptoms of rash: itching, burning and red   Symptoms are moderate and rash seems to be not changing over the course of time.  Previous history of a similar rash? No  Recent exposure history: insect bites    Associated symptoms include: nothing.    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  (MONDAY, WEDNESDAY, AND FRIDAY.) 38 tablet 2     gabapentin (NEURONTIN) 100 MG capsule TAKE 1-3 CAPSULES BY MOUTH AT BEDTIME 90 capsule 10     glipiZIDE (GLUCOTROL) 5 MG tablet TAKE ONE TABLET BY MOUTH TWICE DAILY BEFORE MEALS 180 tablet 3     losartan (COZAAR) 100 MG tablet Take 1 tablet (100 mg) by mouth daily 90 tablet 2      lovastatin (MEVACOR) 40 MG tablet TAKE 1 TABLET (40MG) BY MOUTH AT BEDTIME 90 tablet 2     metFORMIN (GLUCOPHAGE) 1000 MG tablet TAKE ONE TABLET BY MOUTH TWICE DAILY 180 tablet 3     MULTI-VITAMIN OR TABS 1 TABLET DAILY 0 0     ONETOUCH ULTRA test strip CHECK BLOOD SUGAR ONCE TO TWICE DAILY 50 each 4     VITAMIN B-6 100 MG OR TABS 1 x daily 0 0     Social History     Tobacco Use     Smoking status: Never Smoker     Smokeless tobacco: Never Used   Substance Use Topics     Alcohol use: No       ROS:  CONSTITUTIONAL:NEGATIVE for fever, chills, change in weight  MUSCULOSKELETAL: NEGATIVE for significant arthralgias or myalgia    EXAM:   /78   Pulse 83   Temp 98.7  F (37.1  C) (Tympanic)   Wt 54.5 kg (120 lb 3.2 oz)   LMP 10/03/1969   SpO2 98%   BMI 24.28 kg/m    GENERAL: alert, no acute distress.  SKIN: Rash description:    Distribution: localized  Location: groin and thighs    Color: red,  Lesion type: macular, scattered discrete lesions with inflammation  GENERAL APPEARANCE: healthy, alert and no distress  EYES: EOMI,  PERRL, conjunctiva clear  NECK: supple, non-tender to palpation, no adenopathy noted  RESP: lungs clear to auscultation - no rales, rhonchi or wheezes  CV: regular rates and rhythm, normal S1 S2, no murmur noted    ASSESSMENT:  1. Insect bite of thigh, unspecified laterality, initial encounter  Symptomatic cares were discussed in detail.   Reassure   Pt instructed to come back to the clinic for worsening sx    - triamcinolone (KENALOG) 0.1 % external cream; Apply topically 2 times daily  Dispense: 80 g; Refill: 1

## 2019-07-17 DIAGNOSIS — E11.42 TYPE 2 DIABETES MELLITUS WITH DIABETIC POLYNEUROPATHY, WITHOUT LONG-TERM CURRENT USE OF INSULIN (H): ICD-10-CM

## 2019-07-17 RX ORDER — GLIPIZIDE 5 MG/1
TABLET ORAL
Qty: 180 TABLET | Refills: 3 | Status: SHIPPED | OUTPATIENT
Start: 2019-07-17 | End: 2019-12-06

## 2019-07-17 NOTE — TELEPHONE ENCOUNTER
"Requested Prescriptions   Pending Prescriptions Disp Refills     glipiZIDE (GLUCOTROL) 5 MG tablet [Pharmacy Med Name: glipiZIDE Oral Tablet 5 MG] 180 tablet 2     Sig: TAKE ONE TABLET BY MOUTH TWICE DAILY BEFORE MEALS       Sulfonylurea Agents Failed - 7/17/2019  7:00 AM        Failed - Patient has had a Microalbumin in the past 15 mos.     Recent Labs   Lab Test 03/22/18  1032   MICROL 25   UMALCR 13.11   Last Written Prescription Date:  5/6/2018  Last Fill Quantity: 180,  # refills: 3   Last office visit: 4/22/2019 with prescribing provider:  Venkatesh Cardenas     Future Office Visit:      Routing refill request to provider for review/approval because:  Labs not current:  Microalbumin              Passed - Blood pressure less than 140/90 in past 6 months     BP Readings from Last 3 Encounters:   07/11/19 124/78   04/22/19 128/74   05/03/18 130/76                 Passed - Patient has documented LDL within the past 12 mos.     Recent Labs   Lab Test 04/30/19  0724   LDL 63             Passed - Patient has documented A1c within the specified period of time.     If HgbA1C is 8 or greater, it needs to be on file within the past 3 months.  If less than 8, must be on file within the past 6 months.     Recent Labs   Lab Test 04/30/19  0724   A1C 7.5*             Passed - Medication is active on med list        Passed - Patient is age 18 or older        Passed - No active pregnancy on record        Passed - Patient has a recent creatinine (normal) within the past 12 mos.     Recent Labs   Lab Test 04/30/19  0724   CR 1.04             Passed - Patient has not had a positive pregnancy test within the past 12 mos.        Passed - Recent (6 mo) or future (30 days) visit within the authorizing provider's specialty     Patient had office visit in the last 6 months or has a visit in the next 30 days with authorizing provider or within the authorizing provider's specialty.  See \"Patient Info\" tab in inbasket, or \"Choose Columns\" in " Meds & Orders section of the refill encounter.            metFORMIN (GLUCOPHAGE) 1000 MG tablet [Pharmacy Med Name: metFORMIN HCl Oral Tablet 1000 MG] 180 tablet 2     Sig: TAKE ONE TABLET BY MOUTH TWICE DAILY       Biguanide Agents Failed - 7/17/2019  7:00 AM        Failed - Patient has had a Microalbumin in the past 15 mos.     Recent Labs   Lab Test 03/22/18  1032   MICROL 25   UMALCR 13.11   Last Written Prescription Date:  5/6/2018  Last Fill Quantity: 180,  # refills: 3   Last office visit: 4/22/2019 with prescribing provider:  Venkatesh Cardenas     Future Office Visit:    Routing refill request to provider for review/approval because:  Labs not current:  Microalbumin              Passed - Blood pressure less than 140/90 in past 6 months     BP Readings from Last 3 Encounters:   07/11/19 124/78   04/22/19 128/74   05/03/18 130/76                 Passed - Patient has documented LDL within the past 12 mos.     Recent Labs   Lab Test 04/30/19  0724   LDL 63             Passed - Patient is age 10 or older        Passed - Patient has documented A1c within the specified period of time.     If HgbA1C is 8 or greater, it needs to be on file within the past 3 months.  If less than 8, must be on file within the past 6 months.     Recent Labs   Lab Test 04/30/19  0724   A1C 7.5*             Passed - Patient's CR is NOT>1.4 OR Patient's EGFR is NOT<45 within past 12 mos.     Recent Labs   Lab Test 04/30/19  0724   GFRESTIMATED 49*   GFRESTBLACK 57*       Recent Labs   Lab Test 04/30/19  0724   CR 1.04             Passed - Patient does NOT have a diagnosis of CHF.        Passed - Medication is active on med list        Passed - Patient is not pregnant        Passed - Patient has not had a positive pregnancy test within the past 12 mos.         Passed - Recent (6 mo) or future (30 days) visit within the authorizing provider's specialty     Patient had office visit in the last 6 months or has a visit in the next 30 days with  "authorizing provider or within the authorizing provider's specialty.  See \"Patient Info\" tab in inbasket, or \"Choose Columns\" in Meds & Orders section of the refill encounter.              Savita ANGUIANO RN, BSN, PHN      "

## 2019-08-13 DIAGNOSIS — M54.16 LUMBAR RADICULOPATHY: ICD-10-CM

## 2019-08-13 RX ORDER — GABAPENTIN 100 MG/1
CAPSULE ORAL
Qty: 90 CAPSULE | Refills: 11 | Status: SHIPPED | OUTPATIENT
Start: 2019-08-13 | End: 2020-08-19

## 2019-08-13 NOTE — TELEPHONE ENCOUNTER
gabapentin      Last Written Prescription Date:  8/7/18  Last Fill Quantity: 90,   # refills: 10  Last Office Visit: 4/22/19  Future Office visit:       Routing refill request to provider for review/approval because:  Drug not on the G, P or [facility] refill protocol or controlled substance

## 2019-10-28 DIAGNOSIS — E11.42 TYPE 2 DIABETES MELLITUS WITH DIABETIC POLYNEUROPATHY, WITHOUT LONG-TERM CURRENT USE OF INSULIN (H): ICD-10-CM

## 2019-10-28 NOTE — LETTER
[facility]  [address] [id number] [phone]            Tess Sellers  [address] [facility] [address]        October 28, 2019    Dear Tess,    While refilling your prescription today, we noticed that you are due for an appointment with your provider.  We will refill your prescription for 30 days, but a follow-up appointment must be made before any additional refills can be approved.     Taking care of your health is important to us and we look forward to seeing you in the near future.  Please call us at [phone] or [phone] (or use [employer]) to schedule an appointment.     Please disregard this notice if you have already made an appointment.    Sincerely,        [facility]

## 2019-10-28 NOTE — TELEPHONE ENCOUNTER
"Requested Prescriptions   Pending Prescriptions Disp Refills     ONETOUCH ULTRA test strip [Pharmacy Med Name: OneTouch Ultra Blue In Vitro Strip] 50 each 3     Sig: CHECK BLOOD SUGAR ONCE TO TWICE DAILY       Diabetic Supplies Protocol Failed - 10/28/2019  8:57 AM        Failed - Recent (6 mo) or future (30 days) visit within the authorizing provider's specialty     Patient had office visit in the last 6 months or has a visit in the next 30 days with authorizing provider.  See \"Patient Info\" tab in inbasket, or \"Choose Columns\" in Meds & Orders section of the refill encounter.            Passed - Medication is active on med list        Passed - Patient is 18 years of age or older        Medication is being filled for 1 time refill only due to:  needs office visit    "

## 2019-11-18 ENCOUNTER — DOCUMENTATION ONLY (OUTPATIENT)
Dept: LAB | Facility: CLINIC | Age: 84
End: 2019-11-18

## 2019-11-18 DIAGNOSIS — E11.42 TYPE 2 DIABETES MELLITUS WITH DIABETIC POLYNEUROPATHY, WITHOUT LONG-TERM CURRENT USE OF INSULIN (H): Primary | ICD-10-CM

## 2019-11-19 DIAGNOSIS — E11.42 TYPE 2 DIABETES MELLITUS WITH DIABETIC POLYNEUROPATHY, WITHOUT LONG-TERM CURRENT USE OF INSULIN (H): ICD-10-CM

## 2019-11-19 NOTE — TELEPHONE ENCOUNTER
"Requested Prescriptions   Pending Prescriptions Disp Refills     ONETOUCH ULTRA test strip [Pharmacy Med Name: OneTouch Ultra Blue In Vitro Strip] 50 each 0     Sig: CHECK BLOOD SUGAR ONCE TO TWICE DAILY       Diabetic Supplies Protocol Passed - 11/19/2019  7:00 AM        Passed - Medication is active on med list        Passed - Patient is 18 years of age or older        Passed - Recent (6 mo) or future (30 days) visit within the authorizing provider's specialty     Patient had office visit in the last 6 months or has a visit in the next 30 days with authorizing provider.  See \"Patient Info\" tab in inbasket, or \"Choose Columns\" in Meds & Orders section of the refill encounter.              "

## 2019-11-20 PROBLEM — N18.30 CKD (CHRONIC KIDNEY DISEASE) STAGE 3, GFR 30-59 ML/MIN (H): Status: ACTIVE | Noted: 2019-11-20

## 2019-11-21 DIAGNOSIS — E11.42 TYPE 2 DIABETES MELLITUS WITH DIABETIC POLYNEUROPATHY, WITHOUT LONG-TERM CURRENT USE OF INSULIN (H): ICD-10-CM

## 2019-11-21 LAB — HBA1C MFR BLD: 7.1 % (ref 0–5.6)

## 2019-11-21 PROCEDURE — 82043 UR ALBUMIN QUANTITATIVE: CPT | Performed by: INTERNAL MEDICINE

## 2019-11-21 PROCEDURE — 80048 BASIC METABOLIC PNL TOTAL CA: CPT | Performed by: INTERNAL MEDICINE

## 2019-11-21 PROCEDURE — 36415 COLL VENOUS BLD VENIPUNCTURE: CPT | Performed by: INTERNAL MEDICINE

## 2019-11-21 PROCEDURE — 83036 HEMOGLOBIN GLYCOSYLATED A1C: CPT | Performed by: INTERNAL MEDICINE

## 2019-11-22 LAB
ANION GAP SERPL CALCULATED.3IONS-SCNC: 9 MMOL/L (ref 3–14)
BUN SERPL-MCNC: 28 MG/DL (ref 7–30)
CALCIUM SERPL-MCNC: 9.8 MG/DL (ref 8.5–10.1)
CHLORIDE SERPL-SCNC: 107 MMOL/L (ref 94–109)
CO2 SERPL-SCNC: 25 MMOL/L (ref 20–32)
CREAT SERPL-MCNC: 0.87 MG/DL (ref 0.52–1.04)
CREAT UR-MCNC: 51 MG/DL
GFR SERPL CREATININE-BSD FRML MDRD: 61 ML/MIN/{1.73_M2}
GLUCOSE SERPL-MCNC: 117 MG/DL (ref 70–99)
MICROALBUMIN UR-MCNC: 8 MG/L
MICROALBUMIN/CREAT UR: 14.96 MG/G CR (ref 0–25)
POTASSIUM SERPL-SCNC: 4.5 MMOL/L (ref 3.4–5.3)
SODIUM SERPL-SCNC: 141 MMOL/L (ref 133–144)

## 2019-12-06 ENCOUNTER — OFFICE VISIT (OUTPATIENT)
Dept: INTERNAL MEDICINE | Facility: CLINIC | Age: 84
End: 2019-12-06
Payer: COMMERCIAL

## 2019-12-06 VITALS
HEIGHT: 59 IN | TEMPERATURE: 98.2 F | HEART RATE: 85 BPM | DIASTOLIC BLOOD PRESSURE: 80 MMHG | SYSTOLIC BLOOD PRESSURE: 138 MMHG | BODY MASS INDEX: 23.59 KG/M2 | OXYGEN SATURATION: 98 % | WEIGHT: 117 LBS

## 2019-12-06 DIAGNOSIS — N18.30 CKD (CHRONIC KIDNEY DISEASE) STAGE 3, GFR 30-59 ML/MIN (H): ICD-10-CM

## 2019-12-06 DIAGNOSIS — E11.42 TYPE 2 DIABETES MELLITUS WITH DIABETIC POLYNEUROPATHY, WITHOUT LONG-TERM CURRENT USE OF INSULIN (H): ICD-10-CM

## 2019-12-06 DIAGNOSIS — M81.0 OSTEOPOROSIS, UNSPECIFIED OSTEOPOROSIS TYPE, UNSPECIFIED PATHOLOGICAL FRACTURE PRESENCE: ICD-10-CM

## 2019-12-06 DIAGNOSIS — E11.42 TYPE 2 DIABETES MELLITUS WITH DIABETIC POLYNEUROPATHY, WITHOUT LONG-TERM CURRENT USE OF INSULIN (H): Primary | ICD-10-CM

## 2019-12-06 DIAGNOSIS — E78.5 HYPERLIPIDEMIA LDL GOAL <100: ICD-10-CM

## 2019-12-06 DIAGNOSIS — R82.90 FOUL SMELLING URINE: ICD-10-CM

## 2019-12-06 LAB
ALBUMIN UR-MCNC: NEGATIVE MG/DL
APPEARANCE UR: CLEAR
BACTERIA #/AREA URNS HPF: ABNORMAL /HPF
BILIRUB UR QL STRIP: NEGATIVE
COLOR UR AUTO: YELLOW
GLUCOSE UR STRIP-MCNC: NEGATIVE MG/DL
HGB UR QL STRIP: ABNORMAL
KETONES UR STRIP-MCNC: ABNORMAL MG/DL
LEUKOCYTE ESTERASE UR QL STRIP: ABNORMAL
MUCOUS THREADS #/AREA URNS LPF: PRESENT /LPF
NITRATE UR QL: NEGATIVE
PH UR STRIP: 5.5 PH (ref 5–7)
RBC #/AREA URNS AUTO: ABNORMAL /HPF
SOURCE: ABNORMAL
SP GR UR STRIP: 1.01 (ref 1–1.03)
UROBILINOGEN UR STRIP-ACNC: 0.2 EU/DL (ref 0.2–1)
WBC #/AREA URNS AUTO: ABNORMAL /HPF

## 2019-12-06 PROCEDURE — 99214 OFFICE O/P EST MOD 30 MIN: CPT | Performed by: INTERNAL MEDICINE

## 2019-12-06 PROCEDURE — 99207 C PAF COMPLETED  NO CHARGE: CPT | Mod: 25 | Performed by: INTERNAL MEDICINE

## 2019-12-06 PROCEDURE — 81001 URINALYSIS AUTO W/SCOPE: CPT | Performed by: INTERNAL MEDICINE

## 2019-12-06 RX ORDER — GLIPIZIDE 5 MG/1
TABLET ORAL
Qty: 90 TABLET | Refills: 3 | Status: SHIPPED | OUTPATIENT
Start: 2019-12-06 | End: 2020-06-18 | Stop reason: ALTCHOICE

## 2019-12-06 ASSESSMENT — MIFFLIN-ST. JEOR: SCORE: 886.34

## 2019-12-06 NOTE — PATIENT INSTRUCTIONS
Reduce Glipizide to 5mg tab, 1 tab in AM only. Stop the PM dose you are taking now  Continue other medications   Nonfasting lab in mid March 2020  See me in 6 months (June 2020) or earlier as needed   Bone density test - [place]  Urinalysis for abnormal smell

## 2019-12-06 NOTE — PROGRESS NOTES
Subjective     Tess Sellers is a 84 year old female who presents to clinic today for the following health issues:    HPI   Diabetes Follow-up    How often are you checking your blood sugar? One time daily  What time of day are you checking your blood sugars (select all that apply)?  no specific time  Have you had any blood sugars above 200?  No specific time  Have you had any blood sugars below 70?  often    What symptoms do you notice when your blood sugar is low?  None and abd itchiness    What concerns do you have today about your diabetes? None     Do you have any of these symptoms? (Select all that apply)  No numbness or tingling in feet.  No redness, sores or blisters on feet.  No complaints of excessive thirst.  No reports of blurry vision.  No significant changes to weight.     Have you had a diabetic eye exam in the last 12 months? Yes- Date of last eye exam: 12/10/19    BP Readings from Last 2 Encounters:   12/06/19 138/80   07/11/19 124/78     Hemoglobin A1C (%)   Date Value   11/21/2019 7.1 (H)   04/30/2019 7.5 (H)     LDL Cholesterol Calculated (mg/dL)   Date Value   04/30/2019 63   03/22/2018 68       Diabetes Management Resources    Hypertension Follow-up      Do you check your blood pressure regularly outside of the clinic? Yes     Are you following a low salt diet? No    Are your blood pressures ever more than 140 on the top number (systolic) OR more   than 90 on the bottom number (diastolic), for example 140/90? No      How many servings of fruits and vegetables do you eat daily?  2-3    On average, how many sweetened beverages do you drink each day (Examples: soda, juice, sweet tea, etc.  Do NOT count diet or artificially sweetened beverages)?   0    How many days per week do you miss taking your medication? 0    Pt's past medical history, family history, habits, medications and allergies were reviewed with the patient today.  See snap shot for  HCM status. Most recent lab results reviewed with  "pt. Problem list and histories reviewed & adjusted, as indicated.  Additional history as below:     Weight down 5 pounds in 6 mos with better diet  Denies CP, SOB, abdominal pain, polyuria, polydipsia, vision changes, extremity numbness/parasthesias or skin problems.  Has appt 12/20/19 for eye exam   Will have low blood sugar  < 60 about 1-2x/month. Highest is 200 (also rare)  Abnormal urine smell \"for awhile\" Denies vaginal sx    Lab Results   Component Value Date     11/21/2019     Lab Results   Component Value Date    A1C 7.1 11/21/2019     Lab Results   Component Value Date    CHOL 146 04/30/2019     Lab Results   Component Value Date    LDL 63 04/30/2019     Lab Results   Component Value Date    HDL 56 04/30/2019     Lab Results   Component Value Date    TRIG 133 04/30/2019     Lab Results   Component Value Date    CR 0.87 11/21/2019     Lab Results   Component Value Date    ALT 31 04/30/2019     Lab Results   Component Value Date    AST 19 04/30/2019     Lab Results   Component Value Date    MICROL 8 11/21/2019     Lab Results   Component Value Date    TSH 2.29 04/30/2019            Additional ROS:   Constitutional, HEENT, Cardiovascular, Pulmonary, GI and , Neuro, MSK and Psych review of systems/symptoms are otherwise negative or unchanged from previous, except as noted above.      OBJECTIVE:  /80   Pulse 85   Temp 98.2  F (36.8  C) (Oral)   Ht 4' 11\" (1.499 m)   Wt 117 lb (53.1 kg)   LMP 10/03/1969   SpO2 98%   BMI 23.63 kg/m     Estimated body mass index is 23.63 kg/m  as calculated from the following:    Height as of this encounter: 4' 11\" (1.499 m).    Weight as of this encounter: 117 lb (53.1 kg).     Neck: no adenopathy. Thyroid normal to palpation. No bruits  Pulm: Lungs clear to auscultation   CV: Regular rates and rhythm  GI: Soft, nontender, Normal active bowel sounds, No hepatosplenomegaly or masses palpable  Ext: Peripheral pulses intact. No edema. Left 1st MTP bunion. " Minimal callus bilateral medial 1st MTP areas of foot  Neuro: Normal strength and tone, sensory exam grossly normal    Assessment/Plan: (See plan discussion below for further details)  1. Type 2 diabetes mellitus with diabetic polyneuropathy, without long-term current use of insulin (H)  A1c at goal for age.  Having occasional low blood sugar.  Weight down with better diet.  Will therefore reduce glipizide dosing. Continue metformin therapy.  Recheck lab 3 months.   - glipiZIDE (GLUCOTROL) 5 MG tablet; Take 1 tab daily in the AM with breakfast  Dispense: 90 tablet; Refill: 3  - Hemoglobin A1c; Future  - Basic metabolic panel; Future  - Urine Microscopic    2. CKD (chronic kidney disease) stage 3, GFR 30-59 ml/min (H)  History of GFR consistently less than 60 but currently normal at 61.  Recheck kidney function again in 3 months  - Basic metabolic panel; Future    3. Hyperlipidemia LDL goal <100   On statin therapy.  Lipids at goal.  Recheck lab 3 mos  - Lipid panel reflex to direct LDL Fasting; Future  - Comprehensive metabolic panel; Future    4. Osteoporosis, unspecified osteoporosis type, unspecified pathological fracture presence  Previous biphosphonate use  For more than 7 years.  Stopped in 2009 when placed on a drug holiday.  Due for bone density reassessment  - DX Hip/Pelvis/Spine; Future    5. Foul smelling urine  Patient denies dysuria or fever/chills.  Unsure quite how long urine   has had an odd odor.  Denies any other vaginal symptoms.  Check urinalysis  - UA reflex to Microscopic and Culture    Plan discussion:    Reduce Glipizide to 5mg tab, 1 tab in AM only. Stop the PM dose you are taking now  Continue other medications  Fasting lab in mid March 2020  See me in 6 months (June 2020) or earlier as needed   B[facility] density test - [facility]  Urinalysis for abnormal smell  Pt declines flu vaccine       Venkatesh Cardenas MD  Internal Medicine Department  [facility]    (Chart documentation was completed,  in part, with Dragon voice-recognition software. Even though reviewed, some grammatical, spelling, and word errors may remain.)

## 2019-12-06 NOTE — TELEPHONE ENCOUNTER
"Requested Prescriptions   Pending Prescriptions Disp Refills     ONETOUCH ULTRA test strip [Pharmacy Med Name: OneTouch Ultra Blue In Vitro Strip] 50 each 0     Sig: CHECK BLOOD SUGAR ONCE TO TWICE DAILY       Diabetic Supplies Protocol Passed - 12/6/2019  3:29 PM        Passed - Medication is active on med list        Passed - Patient is 18 years of age or older        Passed - Recent (6 mo) or future (30 days) visit within the authorizing provider's specialty     Patient had office visit in the last 6 months or has a visit in the next 30 days with authorizing provider.  See \"Patient Info\" tab in inbasket, or \"Choose Columns\" in Meds & Orders section of the refill encounter.              Prescription approved per [facility] Refill Protocol.    Savita SANTOSN, RN, PHN      "

## 2019-12-10 ENCOUNTER — TRANSFERRED RECORDS (OUTPATIENT)
Dept: HEALTH INFORMATION MANAGEMENT | Facility: CLINIC | Age: 84
End: 2019-12-10

## 2019-12-10 LAB — RETINOPATHY: NEGATIVE

## 2020-02-29 DIAGNOSIS — R60.9 EDEMA, UNSPECIFIED TYPE: ICD-10-CM

## 2020-02-29 DIAGNOSIS — E78.5 HYPERLIPIDEMIA LDL GOAL <100: ICD-10-CM

## 2020-02-29 NOTE — TELEPHONE ENCOUNTER
"Requested Prescriptions   Pending Prescriptions Disp Refills     furosemide (LASIX) 20 MG tablet [Pharmacy Med Name: Furosemide Oral Tablet 20 MG] 38 tablet 1     Sig: TAKE ONE TABLET BY MOUTH THREE TIMES PER WEEK.  (MONDAY, WEDNESDAY, AND FRIDAY.)   Last Written Prescription Date:  6/18/2019  Last Fill Quantity: 38,  # refills: 2   Last Office Visit: 12/6/2019   Future Office Visit:         Diuretics (Including Combos) Protocol Passed - 2/29/2020  8:49 AM        Passed - Blood pressure under 140/90 in past 12 months     BP Readings from Last 3 Encounters:   12/06/19 138/80   07/11/19 124/78   04/22/19 128/74                 Passed - Recent (12 mo) or future (30 days) visit within the authorizing provider's specialty     Patient has had an office visit with the authorizing provider or a provider within the authorizing providers department within the previous 12 mos or has a future within next 30 days. See \"Patient Info\" tab in inbasket, or \"Choose Columns\" in Meds & Orders section of the refill encounter.              Passed - Medication is active on med list        Passed - Patient is age 18 or older        Passed - No active pregancy on record        Passed - Normal serum creatinine on file in past 12 months     Recent Labs   Lab Test 11/21/19  0903   CR 0.87              Passed - Normal serum potassium on file in past 12 months     Recent Labs   Lab Test 11/21/19  0903   POTASSIUM 4.5                    Passed - Normal serum sodium on file in past 12 months     Recent Labs   Lab Test 11/21/19  0903                 Passed - No positive pregnancy test in past 12 months        lovastatin (MEVACOR) 40 MG tablet [Pharmacy Med Name: Lovastatin Oral Tablet 40 MG] 90 tablet 1     Sig: TAKE 1 TABLET (40MG) BY MOUTH AT BEDTIME   Last Written Prescription Date:  5/29/2019  Last Fill Quantity: 90,  # refills: 2   Last Office Visit: 12/6/2019   Future Office Visit:         Statins Protocol Passed - 2/29/2020  8:49 AM    " "    Passed - LDL on file in past 12 months     Recent Labs   Lab Test 04/30/19  0724   LDL 63             Passed - No abnormal creatine kinase in past 12 months     Recent Labs   Lab Test 02/14/17  0921   CKT 35                Passed - Recent (12 mo) or future (30 days) visit within the authorizing provider's specialty     Patient has had an office visit with the authorizing provider or a provider within the authorizing providers department within the previous 12 mos or has a future within next 30 days. See \"Patient Info\" tab in inbasket, or \"Choose Columns\" in Meds & Orders section of the refill encounter.              Passed - Medication is active on med list        Passed - Patient is age 18 or older        Passed - No active pregnancy on record        Passed - No positive pregnancy test in past 12 months          "

## 2020-03-03 RX ORDER — FUROSEMIDE 20 MG
TABLET ORAL
Qty: 38 TABLET | Refills: 1 | Status: SHIPPED | OUTPATIENT
Start: 2020-03-03 | End: 2020-08-25

## 2020-03-03 RX ORDER — LOVASTATIN 40 MG
TABLET ORAL
Qty: 90 TABLET | Refills: 1 | Status: SHIPPED | OUTPATIENT
Start: 2020-03-03 | End: 2020-10-19

## 2020-03-06 ENCOUNTER — TELEPHONE (OUTPATIENT)
Dept: INTERNAL MEDICINE | Facility: CLINIC | Age: 85
End: 2020-03-06

## 2020-03-12 NOTE — TELEPHONE ENCOUNTER
Central Prior Authorization Team   Phone: [phone]    PA Initiation    Medication: gabapentin (NEURONTIN) 100 MG capsule  Insurance Company: [employer] - Phone [phone] Fax [phone]  Pharmacy Filling the Rx: [id number] [address]  Filling Pharmacy Phone: [phone]  Filling Pharmacy Fax: [phone]  Start Date: 3/11/2020      
PA not needed.  Pharmacy processed and patient picked up.      
Prior Authorization Retail Medication Request    Medication/Dose: gabapentin (NEURONTIN) 100 MG capsule  ICD code (if different than what is on RX):  M54.16  Previously Tried and Failed:    Rationale:      Insurance Name:  None in chart  Insurance ID:        Pharmacy Information (if different than what is on RX)  Name:  [facility]  Phone:  [phone]    
5

## 2020-03-17 DIAGNOSIS — I10 ESSENTIAL HYPERTENSION, BENIGN: ICD-10-CM

## 2020-03-17 RX ORDER — LOSARTAN POTASSIUM 100 MG/1
100 TABLET ORAL DAILY
Qty: 90 TABLET | Refills: 1 | Status: SHIPPED | OUTPATIENT
Start: 2020-03-17 | End: 2020-09-29

## 2020-06-18 ENCOUNTER — VIRTUAL VISIT (OUTPATIENT)
Dept: INTERNAL MEDICINE | Facility: CLINIC | Age: 85
End: 2020-06-18
Payer: COMMERCIAL

## 2020-06-18 VITALS — BODY MASS INDEX: 24.56 KG/M2 | HEIGHT: 58 IN | WEIGHT: 117 LBS

## 2020-06-18 DIAGNOSIS — G47.00 INSOMNIA, UNSPECIFIED TYPE: ICD-10-CM

## 2020-06-18 DIAGNOSIS — E11.42 TYPE 2 DIABETES MELLITUS WITH DIABETIC POLYNEUROPATHY, WITHOUT LONG-TERM CURRENT USE OF INSULIN (H): Primary | ICD-10-CM

## 2020-06-18 PROCEDURE — 99214 OFFICE O/P EST MOD 30 MIN: CPT | Mod: 95 | Performed by: INTERNAL MEDICINE

## 2020-06-18 RX ORDER — LANOLIN ALCOHOL/MO/W.PET/CERES
3 CREAM (GRAM) TOPICAL
Qty: 30 TABLET | Refills: 11 | Status: SHIPPED | OUTPATIENT
Start: 2020-06-18 | End: 2021-06-29

## 2020-06-18 RX ORDER — GLIMEPIRIDE 2 MG/1
2 TABLET ORAL 2 TIMES DAILY
Qty: 60 TABLET | Refills: 11 | Status: SHIPPED | OUTPATIENT
Start: 2020-06-18 | End: 2020-12-15

## 2020-06-18 ASSESSMENT — MIFFLIN-ST. JEOR: SCORE: 865.46

## 2020-06-18 NOTE — PATIENT INSTRUCTIONS
Stop Glipizide   Start Glimepiride 2mg tab, 1 tab twice a day (take with breakfast and supper)   Melatonin 3mg tab, 1 tab daily  About 1 hour prior to bedtime for insomnia  Call our appointment desk at [phone]  to schedule a lab appointment   fasting  in  the next 1-2 weeks with the [facility] lab (located in the parking lot on the northside of the [facility].  For fasting labs, please refrain from eating for 8 hours or more.  Be sure to  drink water and take your  medications the day of the test.   Call nurseline [phone] in 2 weeks with update re: your sleep and blood sugars.  Check blood sugars twice a day (before breakfast and bedtime) for 4 days prior to the appointment with me, write them down and have them available to review with the appointment

## 2020-06-18 NOTE — PROGRESS NOTES
"Tess Sellers is a 85 year old female who is being evaluated via a billable telephone visit.      The patient has been notified of following:     \"This telephone visit will be conducted via a call between you and your physician/provider. We have found that certain health care needs can be provided without the need for a physical exam.  This service lets us provide the care you need with a short phone conversation.  If a prescription is necessary we can send it directly to your pharmacy.  If lab work is needed we can place an order for that and you can then stop by our lab to have the test done at a later time.    Telephone visits are billed at different rates depending on your insurance coverage. During this emergency period, for some insurers they may be billed the same as an in-person visit.  Please reach out to your insurance provider with any questions.    If during the course of the call the physician/provider feels a telephone visit is not appropriate, you will not be charged for this service.\"    Patient has given verbal consent for Telephone visit?  Yes    What phone number would you like to be contacted at? [phone]    How would you like to obtain your AVS? Mail a copy    Subjective     Tess Sellers is a 85 year old female who presents via phone visit today for the following health issues:    HPI  Insomnia      Duration: for a while, has talked about it before at visits    Description  Frequency of insomnia:  nightly  Time to fall asleep: over two hours goes to bed at 9 and falls asleep at 4:30am  Middle of night awakening:  nightly  Early morning awakening:  nightly    Accompanying signs and symptoms:  Maybe anxious does not know why she can't fall asleep    History  Similar episodes in past:  YES  Previous evaluation/sleep study:  no     Precipitating or alleviating factors:  New stressful situation: no   Caffeine intake after lunchtime: no   OTC decongestants: no   Any new medications: no "     Therapies tried and outcome: none      Due to the current impact of the Covid19 virus and recommendations by [facility] administration, CDC, etc to limit  clinic visits and pt exposure risk, was recommend pt proceed with virtual phone visit to address acute/stable  medical needs with plan to defer other non-acute health maintenance issues/exam to a future date when less self-isolation is required.    I have reviewed the nursing note as documented above.   See below for other information/data  and my personal notes capturing the substance of my conversation with the patient.       HPI:   Checking sugars  2x/day.   AM sugars  150-180   PM sugars about 1 hr after supper  140-200  Denies CP, SOB, abdominal pain, polyuria, polydipsia, vision changes, extremity numbness/parasthesias or skin problems. Edma well controlled with 3x/week Lasix per pt. No orthopnea, PND  Bedtime 9:30 PM and often restless until about  4:30 AM and has to be up  by 9am. Not worrying about things. Rare caffeine use per pt and none in the PM  Feels tired in the day  Has tried Tylenol PM and not helpful  Denies depression/anxiety    Lab Results   Component Value Date     11/21/2019     Lab Results   Component Value Date    A1C 7.1 11/21/2019     Lab Results   Component Value Date    CHOL 146 04/30/2019     Lab Results   Component Value Date    LDL 63 04/30/2019     Lab Results   Component Value Date    HDL 56 04/30/2019     Lab Results   Component Value Date    TRIG 133 04/30/2019     Lab Results   Component Value Date    CR 0.87 11/21/2019     Lab Results   Component Value Date    ALT 31 04/30/2019     Lab Results   Component Value Date    AST 19 04/30/2019     Lab Results   Component Value Date    MICROL 8 11/21/2019     Lab Results   Component Value Date    TSH 2.29 04/30/2019          Additional ROS:   Constitutional, HEENT, Cardiovascular, Pulmonary, GI and , Neuro, MSK and Psych review of systems/symptoms are otherwise negative or  unchanged from previous, except as noted above.      ASSESSMENT:   1. Type 2 diabetes mellitus with diabetic polyneuropathy, without long-term current use of insulin (H)   Needs improved control with recent blood sugars 150-200. Will change Glipizide to Glimepiride and take BID. Will await update in 2 weeks. Fasting labs for DM as previously ordered next 1-2 weeks  - glimepiride (AMARYL) 2 MG tablet; Take 1 tablet (2 mg) by mouth 2 times daily  Dispense: 60 tablet; Refill: 11    2. Insomnia, unspecified type  Issue of falling asleep. Rare AM caffeine. Will try Melatonin. If not helping, then possible future trial of low dose Lunesta  - melatonin 3 MG tablet; Take 1 tablet (3 mg) by mouth nightly as needed for sleep  Dispense: 30 tablet; Refill: 11      PLAN:   Stop Glipizide   Start Glimepiride 2mg tab, 1 tab twice a day (take with breakfast and supper)   Melatonin 3mg tab, 1 tab daily  About 1 hour prior to bedtime for insomnia  Call our appointment desk at [phone]  to schedule a lab appointment   fasting  in  the next 1-2 weeks with the [facility] lab (located in the parking lot on the northside of the [facility].  For fasting labs, please refrain from eating for 8 hours or more.  Be sure to  drink water and take your  medications the day of the test.   Call nurseline [phone] in 2 weeks with update re: your sleep and blood sugars.  Check blood sugars twice a day (before breakfast and bedtime) for 4 days prior to the appointment with me, write them down and have them available to review with the appointment      Phone call contact time  Call Started at 10:52 am  Call Ended at 11:05am  Total minutes: 13 min    (Chart documentation was completed, in part, with [employer] voice-recognition software. Even though reviewed, some grammatical, spelling, and word errors may remain.)    Venkatesh Cardenas MD  Internal Medicine Department  [facility]

## 2020-08-17 DIAGNOSIS — M54.16 LUMBAR RADICULOPATHY: ICD-10-CM

## 2020-08-18 NOTE — TELEPHONE ENCOUNTER
Gabapentin      Last Written Prescription Date:  8/13/19  Last Fill Quantity: 90,   # refills: 11  Last Office Visit: 6/18/20  Future Office visit:       Routing refill request to provider for review/approval because:  Drug not on the G, P or [facility] refill protocol or controlled substance

## 2020-08-19 RX ORDER — GABAPENTIN 100 MG/1
CAPSULE ORAL
Qty: 90 CAPSULE | Refills: 11 | Status: SHIPPED | OUTPATIENT
Start: 2020-08-19 | End: 2021-06-29

## 2020-08-19 RX ORDER — GABAPENTIN 100 MG/1
CAPSULE ORAL
Qty: 90 CAPSULE | Refills: 0 | Status: SHIPPED | OUTPATIENT
Start: 2020-08-19 | End: 2020-08-19

## 2020-08-24 DIAGNOSIS — R60.9 EDEMA, UNSPECIFIED TYPE: ICD-10-CM

## 2020-08-25 RX ORDER — FUROSEMIDE 20 MG
TABLET ORAL
Qty: 38 TABLET | Refills: 2 | Status: SHIPPED | OUTPATIENT
Start: 2020-08-25 | End: 2021-06-09

## 2020-08-27 DIAGNOSIS — E78.5 HYPERLIPIDEMIA LDL GOAL <100: ICD-10-CM

## 2020-08-27 DIAGNOSIS — E11.42 TYPE 2 DIABETES MELLITUS WITH DIABETIC POLYNEUROPATHY, WITHOUT LONG-TERM CURRENT USE OF INSULIN (H): ICD-10-CM

## 2020-08-27 LAB
ALBUMIN SERPL-MCNC: 3.6 G/DL (ref 3.4–5)
ALP SERPL-CCNC: 70 U/L (ref 40–150)
ALT SERPL W P-5'-P-CCNC: 24 U/L (ref 0–50)
ANION GAP SERPL CALCULATED.3IONS-SCNC: 7 MMOL/L (ref 3–14)
AST SERPL W P-5'-P-CCNC: 13 U/L (ref 0–45)
BILIRUB SERPL-MCNC: 0.4 MG/DL (ref 0.2–1.3)
BUN SERPL-MCNC: 28 MG/DL (ref 7–30)
CALCIUM SERPL-MCNC: 10.2 MG/DL (ref 8.5–10.1)
CHLORIDE SERPL-SCNC: 112 MMOL/L (ref 94–109)
CHOLEST SERPL-MCNC: 151 MG/DL
CO2 SERPL-SCNC: 25 MMOL/L (ref 20–32)
CREAT SERPL-MCNC: 0.95 MG/DL (ref 0.52–1.04)
GFR SERPL CREATININE-BSD FRML MDRD: 55 ML/MIN/{1.73_M2}
GLUCOSE SERPL-MCNC: 64 MG/DL (ref 70–99)
HBA1C MFR BLD: 6.6 % (ref 0–5.6)
HDLC SERPL-MCNC: 70 MG/DL
LDLC SERPL CALC-MCNC: 57 MG/DL
NONHDLC SERPL-MCNC: 81 MG/DL
POTASSIUM SERPL-SCNC: 4 MMOL/L (ref 3.4–5.3)
PROT SERPL-MCNC: 6.9 G/DL (ref 6.8–8.8)
SODIUM SERPL-SCNC: 144 MMOL/L (ref 133–144)
TRIGL SERPL-MCNC: 120 MG/DL

## 2020-08-27 PROCEDURE — 83036 HEMOGLOBIN GLYCOSYLATED A1C: CPT | Performed by: INTERNAL MEDICINE

## 2020-08-27 PROCEDURE — 36415 COLL VENOUS BLD VENIPUNCTURE: CPT | Performed by: INTERNAL MEDICINE

## 2020-08-27 PROCEDURE — 80053 COMPREHEN METABOLIC PANEL: CPT | Performed by: INTERNAL MEDICINE

## 2020-08-27 PROCEDURE — 80061 LIPID PANEL: CPT | Performed by: INTERNAL MEDICINE

## 2020-09-04 DIAGNOSIS — E11.42 TYPE 2 DIABETES MELLITUS WITH DIABETIC POLYNEUROPATHY, WITHOUT LONG-TERM CURRENT USE OF INSULIN (H): ICD-10-CM

## 2020-09-04 NOTE — TELEPHONE ENCOUNTER
Per last refill encounter, were waiting on lab results. Routing to provider to review and advise on refill.

## 2020-09-08 ENCOUNTER — TELEPHONE (OUTPATIENT)
Dept: INTERNAL MEDICINE | Facility: CLINIC | Age: 85
End: 2020-09-08

## 2020-09-08 DIAGNOSIS — E78.5 HYPERLIPIDEMIA LDL GOAL <100: Primary | ICD-10-CM

## 2020-09-08 DIAGNOSIS — E11.42 TYPE 2 DIABETES MELLITUS WITH DIABETIC POLYNEUROPATHY, WITHOUT LONG-TERM CURRENT USE OF INSULIN (H): ICD-10-CM

## 2020-09-08 NOTE — TELEPHONE ENCOUNTER
Reason for Call:  Other call back    Detailed comments: Patient is calling for lab results and wants to know how to proceed.  Should she keep on doing what she is doing.  Patient is requesting a letter with results and course of action.    Phone Number Patient can be reached at: Home number on file [phone] (home)    Best Time: mornings    Can we leave a detailed message on this number? NO    Call taken on 9/8/2020 at 3:12 PM by Kae Wall

## 2020-09-25 ENCOUNTER — TELEPHONE (OUTPATIENT)
Dept: INTERNAL MEDICINE | Facility: CLINIC | Age: 85
End: 2020-09-25

## 2020-09-25 NOTE — LETTER
[facility]  [address] [address]  [phone]  September 25, 2020    Tess Sellers  [address] [facility] [address]    Dear Tess,    We care about your health and based on a review of your medical records, recommend the the following, to better manage your health:      You are in particular need of attention regarding:  -Wellness (Physical) Visit     I am recommending that you:     -schedule a WELLNESS (Physical) APPOINTMENT with me.     Please call us at [phone] or [phone] (or use [employer]) to address the above recommendations.     Thank you for trusting [facility].  We appreciate the opportunity to serve you and look forward to supporting your healthcare needs in the future.    If you have (or plan to have) any of these tests done at a facility other than a [facility] or a [facility], please have the results from these tests sent to your primary physician at [facility].    Healthy Regards,    Venkatesh Cardenas MD/Huma Cervantes, [facility]

## 2020-09-25 NOTE — TELEPHONE ENCOUNTER
Patient Quality Outreach      Summary:    Patient is due/failing the following:   Annual wellness, date due: 02/21/2018 and Immunizations    Type of outreach:    Sent letter.    Questions for provider review:    None                                                                                   **Start Working phrase here:**       Patient has the following on her problem list/HM:     Immunizations       Health Maintenance Due   Topic     Hepatitis B Vaccine (1 of 3 - Risk 3-dose series)                                                     Huma Cervantes CMA       Chart routed to none.

## 2020-09-28 DIAGNOSIS — I10 ESSENTIAL HYPERTENSION, BENIGN: ICD-10-CM

## 2020-09-29 RX ORDER — LOSARTAN POTASSIUM 100 MG/1
TABLET ORAL
Qty: 90 TABLET | Refills: 2 | Status: SHIPPED | OUTPATIENT
Start: 2020-09-29 | End: 2021-06-29

## 2020-10-18 DIAGNOSIS — E78.5 HYPERLIPIDEMIA LDL GOAL <100: ICD-10-CM

## 2020-10-19 RX ORDER — LOVASTATIN 40 MG
TABLET ORAL
Qty: 90 TABLET | Refills: 2 | Status: SHIPPED | OUTPATIENT
Start: 2020-10-19 | End: 2021-06-29

## 2020-10-19 NOTE — TELEPHONE ENCOUNTER
Prescription approved per [facility] Refill Protocol.    Savita SANTOSN, RN, PHN

## 2020-12-10 DIAGNOSIS — E11.42 TYPE 2 DIABETES MELLITUS WITH DIABETIC POLYNEUROPATHY, WITHOUT LONG-TERM CURRENT USE OF INSULIN (H): Primary | ICD-10-CM

## 2020-12-10 RX ORDER — BLOOD SUGAR DIAGNOSTIC
1 STRIP MISCELLANEOUS DAILY
Qty: 100 STRIP | Refills: 0 | Status: SHIPPED | OUTPATIENT
Start: 2020-12-10 | End: 2021-03-09

## 2020-12-10 NOTE — TELEPHONE ENCOUNTER
Reason for Call:  Medication or medication refill    Do you use a [facility]? no      Name of the pharmacy and phone number for the current request  [facility] pharmacy [phone]  Name of the medication requested one touch ultra test strip     Other request asap pt almost out of this     Can we leave a detailed message on this number? YES    Phone number patient can be reached at: Home number on file [phone] (home)    Best Time  Anytime asap please     Call taken on 12/10/2020 at 2:45 PM by Airam Calderon

## 2020-12-15 RX ORDER — GLIMEPIRIDE 2 MG/1
2 TABLET ORAL DAILY
Qty: 90 TABLET | Refills: 2 | Status: SHIPPED | OUTPATIENT
Start: 2020-12-15 | End: 2021-03-10

## 2020-12-16 ENCOUNTER — TELEPHONE (OUTPATIENT)
Dept: INTERNAL MEDICINE | Facility: CLINIC | Age: 85
End: 2020-12-16

## 2020-12-16 DIAGNOSIS — E11.42 TYPE 2 DIABETES MELLITUS WITH DIABETIC POLYNEUROPATHY, WITHOUT LONG-TERM CURRENT USE OF INSULIN (H): Primary | ICD-10-CM

## 2020-12-16 NOTE — TELEPHONE ENCOUNTER
Unclear what the question is. I just spoke with pt last night by phone. Mentioned to her at that time that [employer] will only cover testing blood sugars once a day since not on insulin. Pt was worried about having some strips around to test before driving occ. Told her either would have to pay for some extra out of pocket or pt to only check a few times a week in general if sugars remaining stable and save some strips to have on hand for as needed use like before driving as she is wishing to do. If other questions, try to answer issues and, if not able to, then route back to MD to review

## 2020-12-16 NOTE — TELEPHONE ENCOUNTER
Spoke with pt. Occ low sugars in middle of night. Prior labs reviewed. Slight elevated calcium but  appeared dehydrated with elevated chloride too. Labs otherwise good. Pt to reduce Glimepiride to 2mg tab, 1 tab ain AM only. Stopping the PM dose. WIll repeat fasting labs in May 2021 and will have water to drink the AM of the lab test and see me a few days later

## 2020-12-16 NOTE — TELEPHONE ENCOUNTER
Reason for Call:  Other call back    Detailed comments: Has questions about her test strips    Phone Number Patient can be reached at: Cell number on file:    Telephone Information:   Mobile [phone]       Best Time: any    Can we leave a detailed message on this number? YES    Call taken on 12/16/2020 at 9:42 AM by Nicky Dey

## 2021-01-11 ENCOUNTER — NURSE TRIAGE (OUTPATIENT)
Dept: NURSING | Facility: CLINIC | Age: 86
End: 2021-01-11

## 2021-01-11 NOTE — TELEPHONE ENCOUNTER
2 weeks since bowel movement. Taking dulcolax since last week and no output still.  Has some leaking output and that is all. She does not have abdominal pain. She is not uncomfortable and will wait until Wednesday to see a MD provider.    She agrees to this plan.    Additional Information    Negative: SEVERE abdominal pain (e.g., excruciating) and present > 1 hour    Negative: SEVERE abdominal pain and age > 60    Negative: Bloody, black, or tarry bowel movements    Negative: Abdomen pain is the main symptom and adult male    Negative: Abdomen pain is the main symptom and adult female    Negative: Rectal bleeding or blood in stool is the main symptom    Negative: Patient sounds very sick or weak to the triager    Negative: Constant abdominal pain lasting > 2 hours    Negative: Vomiting bile (green color)    Negative: Vomiting and abdomen looks much more swollen than usual    Negative: Rectal pain or fullness from fecal impaction (rectum full of stool) and NOT better after SITZ bath, suppository or enema    Negative: Abdomen is more swollen than usual    Negative: Last bowel movement (BM) > 4 days ago    Negative: Leaking stool    Negative: Intermittent mild abdominal pain and fever    Constipation persists > 1 week and no improvement after using CARE ADVICE    Negative: Unable to have a bowel movement (BM) without manually removing stool (using finger to pull out stool or perform disimpaction)    Unable to have a bowel movement (BM) without using a laxative, suppository, or enema    Protocols used: DIARRHEA-A-OH, CONSTIPATION-A-OH

## 2021-01-12 ENCOUNTER — OFFICE VISIT (OUTPATIENT)
Dept: INTERNAL MEDICINE | Facility: CLINIC | Age: 86
End: 2021-01-12
Payer: COMMERCIAL

## 2021-01-12 ENCOUNTER — ANCILLARY PROCEDURE (OUTPATIENT)
Dept: GENERAL RADIOLOGY | Facility: CLINIC | Age: 86
End: 2021-01-12
Attending: INTERNAL MEDICINE
Payer: COMMERCIAL

## 2021-01-12 VITALS
SYSTOLIC BLOOD PRESSURE: 150 MMHG | WEIGHT: 124.2 LBS | RESPIRATION RATE: 18 BRPM | TEMPERATURE: 98.3 F | DIASTOLIC BLOOD PRESSURE: 68 MMHG | BODY MASS INDEX: 25.96 KG/M2 | OXYGEN SATURATION: 98 % | HEART RATE: 83 BPM

## 2021-01-12 DIAGNOSIS — K59.00 CONSTIPATION, UNSPECIFIED CONSTIPATION TYPE: ICD-10-CM

## 2021-01-12 DIAGNOSIS — N18.31 STAGE 3A CHRONIC KIDNEY DISEASE (H): ICD-10-CM

## 2021-01-12 DIAGNOSIS — K59.00 CONSTIPATION, UNSPECIFIED CONSTIPATION TYPE: Primary | ICD-10-CM

## 2021-01-12 PROCEDURE — 74019 RADEX ABDOMEN 2 VIEWS: CPT | Performed by: RADIOLOGY

## 2021-01-12 PROCEDURE — 99213 OFFICE O/P EST LOW 20 MIN: CPT | Performed by: INTERNAL MEDICINE

## 2021-01-12 RX ORDER — POLYETHYLENE GLYCOL 3350 17 G/17G
1 POWDER, FOR SOLUTION ORAL 2 TIMES DAILY
Qty: 507 G | Refills: 1 | Status: SHIPPED | OUTPATIENT
Start: 2021-01-12

## 2021-01-12 NOTE — PROGRESS NOTES
Assessment & Plan     Constipation, unspecified constipation type  Unclear etiology. Has tried bisacodyl at home without success. Discussed Miralax and mag citrate. Rx'd for these. Goal is to have at least a couple of bowel movements per day until stools are runny and then back off. Will check KUB given history of many abdominal surgeries but benign belly exam today is reassuring.  - polyethylene glycol (MIRALAX) 17 GM/Dose powder; Take 17 g (1 capful) by mouth 2 times daily  - XR Abdomen 2 Views; Future  - magnesium citrate solution; Take 296 mLs by mouth once for 1 dose    Stage 3a chronic kidney disease  Known issue that I take into account for their medical decisions, no current exacerbations or new concerns.    Return in about 4 months (around 5/12/2021) for Physical Exam.    Willi Ray MD  [facility]    Vera Pulido is a 85 year old who presents to clinic today for the following health issues    HPI   Constipation  Onset/Duration: couple of weeks   Description:  Frequency of bowel movements: has only gone a couple of times and it was little bit   Consistency of stool: Hard round little pebble like balls  Progression of Symptoms: worsening  Accompanying signs and symptoms:    Abdominal pain: no   Rectal pain: no   Blood in stool: no   Nausea/Vomiting: no   Weight loss or gain: no  History:   Similar problems in past: no  History of abdominal surgery: YES  Chronic laxative use: no  New medications: no  Precipitating or alleviating factors: Has had part of colon removed for multiple polyps before  Therapies tried and outcome: Duralax 2 pills, 2 pills and than 3 pills    Tess presents today with complaints of constipation. Started a couple of weeks ago. She is still passing some gas. Has had a few small bowel movements of hard stool. No blood in stool. Has tried bisacodyl without luck. No belly pain. No f/c.    Review of Systems   Constitutional, HEENT,  cardiovascular, pulmonary, gi and gu systems are negative, except as otherwise noted.      Objective    BP (!) 150/68   Pulse 83   Temp 98.3  F (36.8  C) (Tympanic)   Resp 18   Wt 56.3 kg (124 lb 3.2 oz)   LMP 10/03/1969   SpO2 98%   BMI 25.96 kg/m    Body mass index is 25.96 kg/m .  Physical Exam   GENERAL: alert and in no distress.  EYES: conjunctivae/corneas clear. EOMs grossly intact  HENT: NC/AT, facies symmetric.  RESP: No iWOB.  CV: RRR to DP.  GI: NT, mildly distended, without guarding or rebound tenderness. No masses palpated.  MSK: No cyanosis or clubbing noted bilaterally in upper and/or lower extremities.  SKIN: No significant ulcers, lesions, or rashes on the visualized portions of the skin  NEURO: Alert. Oriented. Sensation grossly WNL.

## 2021-01-20 ENCOUNTER — NURSE TRIAGE (OUTPATIENT)
Dept: NURSING | Facility: CLINIC | Age: 86
End: 2021-01-20

## 2021-01-20 ENCOUNTER — VIRTUAL VISIT (OUTPATIENT)
Dept: INTERNAL MEDICINE | Facility: CLINIC | Age: 86
End: 2021-01-20
Payer: COMMERCIAL

## 2021-01-20 VITALS — BODY MASS INDEX: 25.92 KG/M2 | WEIGHT: 124 LBS

## 2021-01-20 DIAGNOSIS — K59.00 CONSTIPATION, UNSPECIFIED CONSTIPATION TYPE: Primary | ICD-10-CM

## 2021-01-20 DIAGNOSIS — Z90.49 H/O PARTIAL RESECTION OF COLON: ICD-10-CM

## 2021-01-20 PROCEDURE — 99441 PR PHYSICIAN TELEPHONE EVALUATION 5-10 MIN: CPT | Mod: 95 | Performed by: INTERNAL MEDICINE

## 2021-01-20 NOTE — TELEPHONE ENCOUNTER
RN triage   Call from pt  Pt states she is still constipated  Seen in clinic last week -- instructed to take mag citrate x 1 -- and miralax   Pt states she is taking double doses of miralax and still not passing stool  Last stool was 4-5 days ago - small - soft  No blood   No nausea or vomiting   No abd pain   Is starting to feel a little bloated   Checks blood sugars Q day @ 5627-0012 -- today = 93     Reviewed home care advice   Per protocol = should be seen   Transferred to      Desirae Lopez RN  [facility]  Triage Nurse Advisor    COVID 19 Nurse Triage Plan/Patient Instructions    Please be aware that novel coronavirus (COVID-19) may be circulating in the community. If you develop symptoms such as fever, cough, or SOB or if you have concerns about the presence of another infection including coronavirus (COVID-19), please contact your health care provider or visit [email or web address].     Disposition/Instructions    Virtual Visit with provider recommended. Reference Visit Selection Guide.    Thank you for taking steps to prevent the spread of this virus.  o Limit your contact with others.  o Wear a simple mask to cover your cough.  o Wash your hands well and often.    Resources    [facility]: About COVID-19: [email or web address]    CDC: What to Do If You're Sick: www.cdc.gov/coronavirus/2019-ncov/about/steps-when-sick.html    CDC: Ending Home Isolation: www.cdc.gov/coronavirus/2019-ncov/hcp/disposition-in-home-patients.html     CDC: Caring for Someone: www.cdc.gov/coronavirus/2019-ncov/if-you-are-sick/care-for-someone.html     [facility]: Interim Guidance for Hospital Discharge to Home: www.health.[facility].[place].us/diseases/coronavirus/hcp/hospdischarge.pdf    [facility] clinical trials (COVID-19 research studies): [email or web address]     Below are the COVID-19 hotlines at the [place] Department of Health ([facility]). Interpreters are available.   o For health questions: Call  [phone] or 1-426.119.9044 (7 a.m. to 7 p.m.)  o For questions about schools and childcare: Call [phone] or 1-808.946.6823 (7 a.m. to 7 p.m.)                       Additional Information    Negative: Abdomen pain is the main symptom and adult male    Negative: Abdomen pain is the main symptom and adult female    Negative: Rectal bleeding or blood in stool is the main symptom    Negative: Constant abdominal pain lasting > 2 hours    Negative: Vomiting bile (green color)    Negative: Vomiting and abdomen looks much more swollen than usual    Last bowel movement (BM) > 4 days ago    Protocols used: CONSTIPATION-A-OH

## 2021-01-20 NOTE — PROGRESS NOTES
Tess is a 85 year old who is being evaluated via a billable telephone visit.      What phone number would you like to be contacted at? [phone]  How would you like to obtain your AVS? Mail a copy     Assessment & Plan     Constipation, unspecified constipation type  H/O partial resection of colon  Unclear etiology of this new constipation. Given her history of colectomy, I think it would be wise to have an expert weigh in on this. Referred her to GI and gave her the number to call and schedule an appt with them prior to hanging up today. Prescribed mag citrate again as it did seem to work. Concerning that she thinks her stools were black when she was having them. It would be odd for her to have no BMs and active GIB given that blood is a cathartic. Encouraged her to monitor her BMs when she has another one and let us know if it is black/tarry or red as this may prompt me to recommend a more urgent evaluation in an ER. She voiced understanding and agreement.  - GASTROENTEROLOGY ADULT REF CONSULT ONLY; Future  - magnesium citrate solution; Take 296 mLs by mouth once for 1 dose      Return in about 3 months (around 4/20/2021) for Re-check.    Willi Ray MD  [facility]     Tess is a 85 year old who presents to clinic today for the following health issues:    HPI   Constipation  Onset/Duration: few weeks  Description:  Frequency of bowel movements: was going everyday for awhile after last visit but now hasn't had one for 4 days   Consistency of stool: loose   Progression of Symptoms: better then worse  Accompanying signs and symptoms:    Abdominal pain: no   Rectal pain: no   Blood in stool: no   Nausea/Vomiting: no   Weight loss or gain: no  History:   Similar problems in past: no  History of abdominal surgery: YES  Chronic laxative use: yes  New medications: no  Precipitating or alleviating factors: Has had part of colon removed for multiple polyps  before  Therapies tried and outcome: Duralax, mag citrate, miralax     Tess and I spoke on the phone today. I saw her for constipation last week. Rx'd magnesium citrate and Miralax. She tells me the magnesium citrate did help move her bowels along but after a few days the constipation came back. She's been taking Miralax twice a day and has not gone. She thinks her stools may be black. No red in her stools. No abdominal pain. Last BM a few days ago. She is not sure she's ever seen a GI doctor.    Review of Systems   Constitutional, HEENT, cardiovascular, pulmonary, gi and gu systems are negative, except as otherwise noted.      Objective       Vitals: No vitals were obtained today due to virtual visit.    Physical Exam   GEN: healthy, alert and no distress  PSYCH: Alert and oriented times 3; coherent speech, normal   rate and volume, able to articulate logical thoughts, able   to abstract reason, no tangential thoughts, no hallucinations   or delusions  Her affect is normal  RESP: No cough, no audible wheezing, able to talk in full sentences  Remainder of exam unable to be completed due to telephone visits    No results found for this or any previous visit (from the past 24 hour(s)).      Phone call duration: 6 minutes

## 2021-01-29 ENCOUNTER — TRANSFERRED RECORDS (OUTPATIENT)
Dept: HEALTH INFORMATION MANAGEMENT | Facility: CLINIC | Age: 86
End: 2021-01-29

## 2021-03-05 DIAGNOSIS — E11.42 TYPE 2 DIABETES MELLITUS WITH DIABETIC POLYNEUROPATHY, WITHOUT LONG-TERM CURRENT USE OF INSULIN (H): ICD-10-CM

## 2021-03-09 RX ORDER — BLOOD SUGAR DIAGNOSTIC
1 STRIP MISCELLANEOUS DAILY
Qty: 100 STRIP | Refills: 1 | Status: SHIPPED | OUTPATIENT
Start: 2021-03-09 | End: 2021-03-10

## 2021-03-10 DIAGNOSIS — E11.42 TYPE 2 DIABETES MELLITUS WITH DIABETIC POLYNEUROPATHY, WITHOUT LONG-TERM CURRENT USE OF INSULIN (H): ICD-10-CM

## 2021-03-10 RX ORDER — BLOOD SUGAR DIAGNOSTIC
STRIP MISCELLANEOUS
Qty: 200 STRIP | Refills: 3 | Status: SHIPPED | OUTPATIENT
Start: 2021-03-10

## 2021-03-10 RX ORDER — BLOOD SUGAR DIAGNOSTIC
STRIP MISCELLANEOUS
Qty: 100 STRIP | Refills: 3 | Status: SHIPPED | OUTPATIENT
Start: 2021-03-10 | End: 2021-03-10

## 2021-03-10 NOTE — TELEPHONE ENCOUNTER
Insurance will not allow blood sugar monitoring more than once a day since patient is not on insulin.  If patient is having low blood sugars, then patient should stop taking glimepiride.  Continue current dose of Metformin which should not cause patient to have low blood sugar risk.  Recommend checking blood sugars in the morning on even days before breakfast and before bedtime on odd days.  Patient to record blood sugars and schedule a virtual telephone visit with me in 1 to 2 weeks to review blood sugars off of glimepiride.  Assist with scheduling virtual telephone visit

## 2021-03-10 NOTE — TELEPHONE ENCOUNTER
Pt wants to test more than once a day . And she has been having lows in the middle of the night .Iesha Stoddard RN

## 2021-03-10 NOTE — TELEPHONE ENCOUNTER
Spoke with pt and she states she talked to the pharmacy and her insurance and they will do whatever the MD prescribes . So will MD prescribe more than one a day ?.Iesha Stoddard RN  Pt is aware to stop Glimepiride.s.g

## 2021-03-11 NOTE — TELEPHONE ENCOUNTER
If insurance will cover, then OK for BID monitoring (before breakfast and supper on even days and before breakfast and bedtime on odd days so she can see how the sugars are going to start the day and how they are before and after her biggest  meal

## 2021-06-16 ENCOUNTER — PATIENT OUTREACH (OUTPATIENT)
Dept: INTERNAL MEDICINE | Facility: CLINIC | Age: 86
End: 2021-06-16

## 2021-06-16 NOTE — TELEPHONE ENCOUNTER
Patient Quality Outreach      Summary:    Patient has the following on her problem list/HM:     Hypertension   Last three blood pressure readings:  BP Readings from Last 3 Encounters:   01/12/21 (!) 150/68   12/06/19 138/80   07/11/19 124/78     Blood pressure: Failed    HTN Guidelines:  ? 139/89     Patient is due/failing the following:   Hypertension follow-up visit and Annual wellness, date due: 02/2018    Type of outreach:    Patient is scheduled for upcoming appointment    Questions for provider review:    None                                                                                                                                     Chetna Yang CMA     Chart routed to None.

## 2021-06-29 ENCOUNTER — OFFICE VISIT (OUTPATIENT)
Dept: INTERNAL MEDICINE | Facility: CLINIC | Age: 86
End: 2021-06-29
Payer: COMMERCIAL

## 2021-06-29 VITALS
SYSTOLIC BLOOD PRESSURE: 122 MMHG | RESPIRATION RATE: 16 BRPM | OXYGEN SATURATION: 98 % | TEMPERATURE: 97.3 F | HEART RATE: 92 BPM | DIASTOLIC BLOOD PRESSURE: 70 MMHG | HEIGHT: 58 IN | WEIGHT: 121 LBS | BODY MASS INDEX: 25.4 KG/M2

## 2021-06-29 DIAGNOSIS — E78.5 HYPERLIPIDEMIA LDL GOAL <100: ICD-10-CM

## 2021-06-29 DIAGNOSIS — E11.42 TYPE 2 DIABETES MELLITUS WITH DIABETIC POLYNEUROPATHY, WITHOUT LONG-TERM CURRENT USE OF INSULIN (H): ICD-10-CM

## 2021-06-29 DIAGNOSIS — L98.9 SKIN DISORDER: ICD-10-CM

## 2021-06-29 DIAGNOSIS — N39.498 OTHER URINARY INCONTINENCE: ICD-10-CM

## 2021-06-29 DIAGNOSIS — M54.16 LUMBAR RADICULOPATHY: ICD-10-CM

## 2021-06-29 DIAGNOSIS — I10 ESSENTIAL HYPERTENSION, BENIGN: ICD-10-CM

## 2021-06-29 DIAGNOSIS — Z00.00 MEDICARE ANNUAL WELLNESS VISIT, INITIAL: ICD-10-CM

## 2021-06-29 DIAGNOSIS — R82.90 ABNORMAL URINE ODOR: ICD-10-CM

## 2021-06-29 LAB — HBA1C MFR BLD: 7.2 % (ref 0–5.6)

## 2021-06-29 PROCEDURE — 80053 COMPREHEN METABOLIC PANEL: CPT | Performed by: INTERNAL MEDICINE

## 2021-06-29 PROCEDURE — 36415 COLL VENOUS BLD VENIPUNCTURE: CPT | Performed by: INTERNAL MEDICINE

## 2021-06-29 PROCEDURE — 83036 HEMOGLOBIN GLYCOSYLATED A1C: CPT | Performed by: INTERNAL MEDICINE

## 2021-06-29 PROCEDURE — 99214 OFFICE O/P EST MOD 30 MIN: CPT | Mod: 25 | Performed by: INTERNAL MEDICINE

## 2021-06-29 PROCEDURE — 80061 LIPID PANEL: CPT | Performed by: INTERNAL MEDICINE

## 2021-06-29 PROCEDURE — 99207 PR FOOT EXAM NO CHARGE: CPT | Mod: 25 | Performed by: INTERNAL MEDICINE

## 2021-06-29 PROCEDURE — G0438 PPPS, INITIAL VISIT: HCPCS | Performed by: INTERNAL MEDICINE

## 2021-06-29 RX ORDER — LOVASTATIN 40 MG
TABLET ORAL
Qty: 90 TABLET | Refills: 3 | Status: SHIPPED | OUTPATIENT
Start: 2021-06-29 | End: 2022-06-13

## 2021-06-29 RX ORDER — GABAPENTIN 100 MG/1
CAPSULE ORAL
Qty: 90 CAPSULE | Refills: 11 | Status: SHIPPED | OUTPATIENT
Start: 2021-06-29

## 2021-06-29 RX ORDER — LOSARTAN POTASSIUM 100 MG/1
100 TABLET ORAL DAILY
Qty: 90 TABLET | Refills: 3 | Status: SHIPPED | OUTPATIENT
Start: 2021-06-29 | End: 2022-06-13

## 2021-06-29 ASSESSMENT — ACTIVITIES OF DAILY LIVING (ADL): CURRENT_FUNCTION: NO ASSISTANCE NEEDED

## 2021-06-29 ASSESSMENT — MIFFLIN-ST. JEOR: SCORE: 870.66

## 2021-06-29 NOTE — PROGRESS NOTES
"SUBJECTIVE:   Tess Sellers is a 86 year old female who presents for Preventive Visit.    Are you in the first 12 months of your Medicare coverage?  No    Healthy Habits:     In general, how would you rate your overall health?  Good    Frequency of exercise:  None    Duration of exercise:  Other    Do you usually eat at least 4 servings of fruit and vegetables a day, include whole grains    & fiber and avoid regularly eating high fat or \"junk\" foods?  Yes    Taking medications regularly:  Yes    Barriers to taking medications:  None    Medication side effects:  None    Ability to successfully perform activities of daily living:  No assistance needed    Home Safety:  No safety concerns identified    Hearing Impairment:  Difficulty following a conversation in a noisy restaurant or crowded room, feel that people are mumbling or not speaking clearly and difficulty understanding soft or whispered speech    In the past 6 months, have you been bothered by leaking of urine? Yes    In general, how would you rate your overall mental or emotional health?  Good      PHQ-2 Total Score: 0    Additional concerns today:  Yes    Do you feel safe in your environment? Yes    Have you ever done Advance Care Planning? (For example, a Health Directive, POLST, or a discussion with a medical provider or your loved ones about your wishes): Yes, advance care planning is on file.       Fall risk  Fallen 2 or more times in the past year?: Yes  Any fall with injury in the past year?: No    Cognitive Screening   1) Repeat 3 items (Leader, Season, Table)    2) Clock draw: NORMAL  3) 3 item recall: Recalls 2 objects   Results: NORMAL clock, 2 items recalled: COGNITIVE IMPAIRMENT LESS LIKELY    Mini-CogTM Copyright VONDA Hernández. Licensed by the author for use in [facility]; reprinted with permission (megan@.[facility]). All rights reserved.      Do you have sleep apnea, excessive snoring or daytime drowsiness?: no    Reviewed and updated as " needed this visit by clinical staff                 Reviewed and updated as needed this visit by Provider                Social History     Tobacco Use     Smoking status: Never Smoker     Smokeless tobacco: Never Used   Substance Use Topics     Alcohol use: No     If you drink alcohol do you typically have >3 drinks per day or >7 drinks per week? No    Alcohol Use 2/21/2017   Prescreen: >3 drinks/day or >7 drinks/week? The patient does not drink >3 drinks per day nor >7 drinks per week.         Current providers sharing in care for this patient include:   Patient Care Team:  Venkatesh Cardenas MD as PCP - General (Internal Medicine)  Natalie Chapa, RN as   Willi Wiseman MD as Assigned PCP    The following health maintenance items are reviewed in Epic and correct as of today:  Health Maintenance Due   Topic Date Due     ANNUAL REVIEW OF  ORDERS  Never done     ZOSTER IMMUNIZATION (2 of 3) 12/22/2009     MEDICARE ANNUAL WELLNESS VISIT  02/21/2018     DIABETIC FOOT EXAM  05/06/2019     COLORECTAL CANCER SCREENING  09/29/2019     FALL RISK ASSESSMENT  04/22/2020     MICROALBUMIN  11/21/2020     EYE EXAM  12/10/2020     A1C  02/27/2021     COVID-19 Vaccine (2 - Moderna 2-dose series) 04/26/2021     Labs reviewed in EPIC      Mammogram Screening - Patient over age 75, has elected to discontinue screenings.  Pertinent mammograms are reviewed under the imaging tab.    Review of Systems  CONSTITUTIONAL: NEGATIVE for fever, chills. Weight down 2 pounds  INTEGUMENTARY/SKIN:  POSITIVE for chronic rash on tip of nose  EYES: NEGATIVE for vision changes or irritation. Has glasses. Due for eye exam   ENT/MOUTH: NEGATIVE for ear, mouth and throat problems  RESP: NEGATIVE for significant cough or SOB  BREAST: NEGATIVE for masses, tenderness or discharge  CV: NEGATIVE for chest pain, palpitations . Stable control peripheral edema with Lasix 3x/week. No orthopnea, PND  GI: NEGATIVE for nausea, abdominal pain,  "heartburn. Occ constipation.   Occ prune juice that help sx  : NEGATIVE for frequency, dysuria, or hematuria. Has some urine incontinence. Occ odor of urine. Has nocturia x2  MUSCULOSKELETAL:  POSITIVE for some chronic LBP.  Prior MRI LS spine 2017 reviewed  with pt. See report  for dtails  NEURO: NEGATIVE for weakness, dizziness.  POSITIVE fr neuropathy and and rare radicular sx. On gabapentin 100mg qhs  ENDOCRINE: NEGATIVE for temperature intolerance. Blood sugars with DM  in AM and rare 60s at night. Other night 150s  HEME: NEGATIVE for bleeding problems  PSYCHIATRIC: NEGATIVE for changes in mood or affect overall. Some troubles falling asleep. Not worrying about things. Has 1 coffee in AM only. Failed Melatonin  Trial     OBJECTIVE:   /70   Pulse 92   Temp 97.3  F (36.3  C) (Temporal)   Resp 16   Ht 1.461 m (4' 9.5\")   Wt 54.9 kg (121 lb)   LMP 10/03/1969   SpO2 98%   BMI 25.73 kg/m   Estimated body mass index is 25.92 kg/m  as calculated from the following:    Height as of 6/18/20: 1.473 m (4' 10\").    Weight as of 1/20/21: 56.2 kg (124 lb).  Physical Exam  General appearance - healthy, alert, no distress  Skin -  1cm erythema with irregular border nasal tip.   Head - normocephalic, atraumatic  Eyes - BRODY, EOMI, fundi exam with nondilated pupils negative.  Ears - External ears normal. Canals clear. TM's normal.  Nose/Sinuses - Nares normal. Septum midline. Mucosa normal. No drainage or sinus tenderness.  Oropharynx - No erythema, no adenopathy, no exudates.  Neck - Supple without adenopathy or thyromegaly. No bruits.  Lungs - Clear to auscultation without wheezes/rhonchi.  Heart - Regular rate and rhythm without murmurs, clicks, or gallops.  Nodes - No supraclavicular, axillary, or inguinal adenopathy palpable.  Breasts - deferred  Abdomen - Abdomen soft, non-tender. BS normal. No masses or hepatosplenomegaly palpable. No bruits.  Extremities -No cyanosis, clubbing or edema.  "   Musculoskeletal - Spine ROM normal. Muscular strength intact.  Minimal tenderness to palpation bilateral paralumbar musculature. Mld bunion formation bilateral 1at MTP joints of feet.   Peripheral pulses - radial=4/4, femoral=4/4, posterior tibial=4/4, dorsalis pedis=4/4,  Neuro - Gait normal. Reflexes normal and symmetric. Sensation grossly WNL.  Genital/Rectal - deferred      ASSESSMENT / PLAN:   1. Medicare annual wellness visit, initial  Due for eye exam.  Patient states that she has had both Moderna vaccines given.  Only 1 recorded in patient's chart.  She will call us with the date of the other vaccine.  Declines mammogram studies with age.  Other healthcare maintenance up-to-date     2. Type 2 diabetes mellitus with diabetic polyneuropathy, without long-term current use of insulin (H)  Previously controlled.  Lower blood sugar in the middle the night though this would generally be unexpected with Metformin.  Will decrease p.m. dosage of Metformin.  Labs as ordered  - FOOT EXAM  - Hemoglobin A1c  - metFORMIN (GLUCOPHAGE) 1000 MG tablet; 1 tablet by mouth in AM and 1/2 tablet in the PM  Dispense: 145 tablet; Refill: 3    3. Hyperlipidemia LDL goal <100  Previously controlled.  Continue current medication.  Labs as ordered  - Lipid panel reflex to direct LDL Fasting  - Comprehensive metabolic panel  - lovastatin (MEVACOR) 40 MG tablet; TAKE 1 TABLET (40MG) BY MOUTH AT BEDTIME  Dispense: 90 tablet; Refill: 3    4. Skin disorder  Chronic nonhealing erythema tip of nose.  Patient states rarely bleeds.  Will have patient see dermatology for possible biopsy  - ADULT DERMATOLOGY REFERRAL; Future    5. Lumbar radiculopathy  Chronic low back pain.  Known arthritis and lumbar stenosis.  Patient declines steroid injection.  Continue gabapentin 1 capsule at bedtime currently.  May titrate up as needed up to 3 capsules at bedtime.  Discussed stretching exercises for patient to do in the morning daily  - gabapentin  "(NEURONTIN) 100 MG capsule; TAKE 1-3 CAPSULES BY MOUTH AT BEDTIME  Dispense: 90 capsule; Refill: 11    6. BENIGN HYPERTENSION  Controlled.  Continue current medication  - losartan (COZAAR) 100 MG tablet; Take 1 tablet (100 mg) by mouth daily  Dispense: 90 tablet; Refill: 3    7. Abnormal urine odor  Patient denies dysuria.  Lab as ordered  - *UA reflex to Microscopic and Culture ([place] and [facility] (except [place] and [place]); Future    8. Other urinary incontinence  1 cup of coffee in the morning.  Chronic mild incontinence.  Patient using adult pad her absorption.  Already has some baseline mild constipation and urinary dry mouth so patient not interested in medication treatment therapy.  Counseled patient to discontinue caffeine use and schedule more frequent bathroom breaks    Patient has been advised of split billing requirements and indicates understanding: Yes       COUNSELING:      Estimated body mass index is 25.92 kg/m  as calculated from the following:    Height as of 6/18/20: 1.473 m (4' 10\").    Weight as of 1/20/21: 56.2 kg (124 lb).        She reports that she has never smoked. She has never used smokeless tobacco.      Appropriate preventive services were discussed with this patient, including applicable screening as appropriate for cardiovascular disease, diabetes, osteopenia/osteoporosis, and glaucoma.  As appropriate for age/gender, discussed screening for colorectal cancer, prostate cancer, breast cancer, and cervical cancer. Checklist reviewing preventive services available has been given to the patient.    Reviewed patients plan of care and provided an AVS. The Basic Care Plan (routine screening as documented in Health Maintenance) for Tess meets the Care Plan requirement. This Care Plan has been established and reviewed with the Patient.    Counseling Resources:  ATP IV Guidelines  Pooled Cohorts Equation Calculator  Breast Cancer Risk Calculator  Breast Cancer: Medication to " Reduce Risk  FRAX Risk Assessment  ICSI Preventive Guidelines  Dietary Guidelines for Americans, 2010  USDA's MyPlate  ASA Prophylaxis  Lung CA Screening      PLAN:  Call clinic  [phone] with date of your two  Moderna Covid vaccinations  Reduce Metformin to 1 tab in the AM and 1/2 tab in the PM for diabetes to prevent low sugars in the middle of the night  Continue other meds.  Prescriptions refilled.    Labs today as ordered  Schedule an eye appointment  Referral to [facility] at the  [facility] 3rd floor re: your nose skin changes. The schedulers will call you to schedule the appointment  Back stretching exercises in AM  If back bothersome, may increase the Gabapentin to 2 or 3 capsules at bedtime as long as not causing lightheadedness  Pt was informed regarding extra E&M billing for management of new or established medical issues not related to today's wellness visit        Venkatesh Cardenas MD  [facility]

## 2021-06-29 NOTE — PATIENT INSTRUCTIONS
Call clinic  [phone] with date of your two  Moderna Covid vaccinations  Reduce Metformin to 1 tab in the AM and 1/2 tab in the PM for diabetes to prevent low sugars in the middle of the night  Continue other meds.  Prescriptions refilled.    Labs today as ordered  Schedule an eye appointment  Referral to [facility] at the  [facility] 3rd floor re: your nose skin changes. The schedulers will call you to schedule the appointment  Back stretching exercises in AM  If back bothersome, may increase the Gabapentin to 2 or 3 capsules at bedtime as long as not causing lightheadedness  Pt was informed regarding extra E&M billing for management of new or established medical issues not related to today's wellness visit

## 2021-06-30 LAB
ALBUMIN SERPL-MCNC: 3.8 G/DL (ref 3.4–5)
ALP SERPL-CCNC: 86 U/L (ref 40–150)
ALT SERPL W P-5'-P-CCNC: 31 U/L (ref 0–50)
ANION GAP SERPL CALCULATED.3IONS-SCNC: 5 MMOL/L (ref 3–14)
AST SERPL W P-5'-P-CCNC: 17 U/L (ref 0–45)
BILIRUB SERPL-MCNC: 0.4 MG/DL (ref 0.2–1.3)
BUN SERPL-MCNC: 35 MG/DL (ref 7–30)
CALCIUM SERPL-MCNC: 9.4 MG/DL (ref 8.5–10.1)
CHLORIDE SERPL-SCNC: 109 MMOL/L (ref 94–109)
CHOLEST SERPL-MCNC: 225 MG/DL
CO2 SERPL-SCNC: 26 MMOL/L (ref 20–32)
CREAT SERPL-MCNC: 1.11 MG/DL (ref 0.52–1.04)
GFR SERPL CREATININE-BSD FRML MDRD: 45 ML/MIN/{1.73_M2}
GLUCOSE SERPL-MCNC: 174 MG/DL (ref 70–99)
HDLC SERPL-MCNC: 66 MG/DL
LDLC SERPL CALC-MCNC: 129 MG/DL
NONHDLC SERPL-MCNC: 159 MG/DL
POTASSIUM SERPL-SCNC: 4.4 MMOL/L (ref 3.4–5.3)
PROT SERPL-MCNC: 7.4 G/DL (ref 6.8–8.8)
SODIUM SERPL-SCNC: 140 MMOL/L (ref 133–144)
TRIGL SERPL-MCNC: 151 MG/DL

## 2021-07-01 ENCOUNTER — TELEPHONE (OUTPATIENT)
Dept: INTERNAL MEDICINE | Facility: CLINIC | Age: 86
End: 2021-07-01

## 2021-07-01 NOTE — TELEPHONE ENCOUNTER
Pt calling to report COVID vaccine info for chart- 4/26/21 second dose LOT [id number]  3/29/21 first dose LOT [id number]  Both Moderna   First dose already in chart, entered second dose    Patient also wants advisement on recent labs. Please advise. There is still a future urine test order, says she could not make urine to leave sample. Has had bad odor to urine for a long time, no other symptoms.  Is waiting to see a dermatologist for her nose, has not been contacted to schedule yet. Wonders if she could leave urine sample when she sees derm? Not sure how long until she will be seen by them, did transfer her to scheduling to make derm appt.    Update: patient scheduled derm appt for September? Does she need sooner? And please advise on labs and urine sample

## 2021-07-04 DIAGNOSIS — R60.9 EDEMA, UNSPECIFIED TYPE: ICD-10-CM

## 2021-07-05 NOTE — TELEPHONE ENCOUNTER
Failed protocol.  please route to  team if patient needs an appointment     Rosita ARMSTRONGRN BSN  [facility]  [phone]

## 2021-07-14 RX ORDER — FUROSEMIDE 20 MG
TABLET ORAL
Qty: 36 TABLET | Refills: 0 | Status: SHIPPED | OUTPATIENT
Start: 2021-07-14 | End: 2021-12-02

## 2021-08-06 ENCOUNTER — TELEPHONE (OUTPATIENT)
Dept: INTERNAL MEDICINE | Facility: CLINIC | Age: 86
End: 2021-08-06

## 2021-08-06 NOTE — TELEPHONE ENCOUNTER
Reason for Call:  Other call back    Detailed comments: had a blood test done about month , month and a half ago , and has not seen the results, says she cant find them, she is requesting a copy of those results, and a call back    Phone Number Patient can be reached at: Cell number on file:    Telephone Information:   Mobile [phone]       Best Time: anytime     Can we leave a detailed message on this number? YES    Call taken on 8/6/2021 at 3:17 PM by Diana Messina

## 2021-08-18 ENCOUNTER — NURSE TRIAGE (OUTPATIENT)
Dept: INTERNAL MEDICINE | Facility: CLINIC | Age: 86
End: 2021-08-18

## 2021-08-18 NOTE — TELEPHONE ENCOUNTER
S-(situation): Calling to report that fasting blood sugars have been greater than 200 since 7/21/21. No associated symptoms. Patient states that she has increased her metformin to 1000 mg BID and sugars have not gone down.    B-(background): Patient denies change in diet or medication changes. States that she has had to give up mowing the lawn and walking. States that for quite awhile now she has been losing her balance.   States that she does not walk with a walker and the roads around her home are too bumpy.    A-(assessment): persistent high blood sugars, asymptomatic    R-(recommendations): patient scheduled for appointment on Tuesday 8/24 with Dr. Cardenas. Advised to call back if any symptoms or blood sugars over 300 mg/dL    Routing to Dr. Cardenas for any advisement prior to visit on 8/24.  DO NOT leave the patient a message.     Reason for Disposition    Caller has NON-URGENT medication question about med that PCP prescribed and triager unable to answer question    Blood glucose 240 - 300 mg/dL (13.3 - 16.7 mmol/L)    Additional Information    Negative: Unconscious or difficult to awaken    Negative: Acting confused (e.g., disoriented, slurred speech)    Negative: Very weak (can't stand)    Negative: Sounds like a life-threatening emergency to the triager    Negative: Vomiting and signs of dehydration (e.g., very dry mouth, lightheaded, dark urine)    Negative: Blood glucose > 240 mg/dL (13.3 mmol/L) and rapid breathing    Negative: Blood glucose > 500 mg/dL (27.8 mmol/L)    Negative: Blood glucose > 240 mg/dL (13.3 mmol/L) AND urine ketones moderate-large (or more than 1+)    Negative: Blood glucose > 240 mg/dL (13.3 mmol/L) and blood ketones > 1.4 mmol/L    Negative: Blood glucose > 240 mg/dL (13.3 mmol/L) AND vomiting AND unable to check for ketones (in blood or urine)    Negative: Vomiting lasting > 4 hours    Negative: Patient sounds very sick or weak to the triager    Negative: Fever > 100.4 F (38.0 C)     "Negative: Caller has URGENT medication or insulin pump question and triager unable to answer question    Negative: Blood glucose > 400 mg/dL (22.2 mmol/L)    Negative: Blood glucose > 300 mg/dL (16.7 mmol/L) AND two or more times in a row    Negative: Urine ketones moderate - large (or blood ketones > 1.4 mmol/L)    Negative: New-onset diabetes suspected (e.g., frequent urination, weak, weight loss)    Negative: Symptoms of high blood sugar (e.g., frequent urination, weak, weight loss) and not able to test blood glucose    Negative: Patient wants to be seen    Negative: Blood glucose > 300 mg/dL (16.7 mmol/L)    Answer Assessment - Initial Assessment Questions  1. BLOOD GLUCOSE: \"What is your blood glucose level?\"       200 at 3 am, up to 256 at 7 or 8 am. This been going on since 7/21  2. ONSET: \"When did you check the blood glucose?\"      Started 7/21  3. USUAL RANGE: \"What is your glucose level usually?\" (e.g., usual fasting morning value, usual evening value)      Morning usual 7/9 120  4. KETONES: \"Do you check for ketones (urine or blood test strips)?\" If yes, ask: \"What does the test show now?\"       no  5. TYPE 1 or 2:  \"Do you know what type of diabetes you have?\"  (e.g., Type 1, Type 2, Gestational; doesn't know)       Type 2  6. INSULIN: \"Do you take insulin?\" \"What type of insulin(s) do you use? What is the mode of delivery? (syringe, pen; injection or pump)?\"       no  7. DIABETES PILLS: \"Do you take any pills for your diabetes?\" If yes, ask: \"Have you missed taking any pills recently?\"      Metformin 1000 mg BID  8. OTHER SYMPTOMS: \"Do you have any symptoms?\" (e.g., fever, frequent urination, difficulty breathing, dizziness, weakness, vomiting)      none  9. PREGNANCY: \"Is there any chance you are pregnant?\" \"When was your last menstrual period?\"      NA    Protocols used: DIABETES - HIGH BLOOD SUGAR-A-OH    Radha Krause RN    "

## 2021-08-24 ENCOUNTER — OFFICE VISIT (OUTPATIENT)
Dept: INTERNAL MEDICINE | Facility: CLINIC | Age: 86
End: 2021-08-24
Payer: COMMERCIAL

## 2021-08-24 VITALS
SYSTOLIC BLOOD PRESSURE: 126 MMHG | DIASTOLIC BLOOD PRESSURE: 78 MMHG | HEART RATE: 88 BPM | RESPIRATION RATE: 16 BRPM | TEMPERATURE: 98.4 F | WEIGHT: 117 LBS | HEIGHT: 57 IN | OXYGEN SATURATION: 98 % | BODY MASS INDEX: 25.24 KG/M2

## 2021-08-24 DIAGNOSIS — E11.42 TYPE 2 DIABETES MELLITUS WITH DIABETIC POLYNEUROPATHY, WITHOUT LONG-TERM CURRENT USE OF INSULIN (H): Primary | ICD-10-CM

## 2021-08-24 DIAGNOSIS — E78.5 HYPERLIPIDEMIA LDL GOAL <100: ICD-10-CM

## 2021-08-24 DIAGNOSIS — N18.31 STAGE 3A CHRONIC KIDNEY DISEASE (H): ICD-10-CM

## 2021-08-24 PROCEDURE — 99214 OFFICE O/P EST MOD 30 MIN: CPT | Performed by: INTERNAL MEDICINE

## 2021-08-24 PROCEDURE — 36415 COLL VENOUS BLD VENIPUNCTURE: CPT | Performed by: INTERNAL MEDICINE

## 2021-08-24 PROCEDURE — 80061 LIPID PANEL: CPT | Performed by: INTERNAL MEDICINE

## 2021-08-24 PROCEDURE — 80048 BASIC METABOLIC PNL TOTAL CA: CPT | Performed by: INTERNAL MEDICINE

## 2021-08-24 RX ORDER — GLIMEPIRIDE 4 MG/1
TABLET ORAL
Qty: 30 TABLET | Refills: 11 | Status: SHIPPED | OUTPATIENT
Start: 2021-08-24 | End: 2022-07-28

## 2021-08-24 ASSESSMENT — MIFFLIN-ST. JEOR: SCORE: 844.59

## 2021-08-24 NOTE — PATIENT INSTRUCTIONS
Continue current medications for now   Start Glimepiirde 4mg tablet, 1/2 tablet daily in the AM for diabetes and blood sugars.   If blood sugars still > 130 in AM and > 180 in the PM after 1 week, then increase to 1 tab Glimepiride (4mg) in the AM  Check sugars in AM of odd days before breakfast and before bedtime on even days so still once a day  If blood sugars still high despite adding  Glimepiride 4mg tab, then contact clinic   Labs as ordered   Repeat nonfasting A1C lab in late November  Schedule an eye appointment  I would recommend you receive an influenza (flu) vaccine  this Fall (October or early November)  Probable covid vaccine booster shot 8 months after your previous vaccine series. Will await further guidance from the CDC in September

## 2021-08-24 NOTE — PROGRESS NOTES
ASSESSMENT:   1. Type 2 diabetes mellitus with diabetic polyneuropathy, without long-term current use of insulin (H)  Previous use of glimepiride with a total of 4 mg daily had control blood sugars well but with occasional getting some low blood sugars in the middle the night with dosing 2 mg twice daily in addition to her Metformin.  Blood sugars now high.  Ice not changed.  We will continue Metformin and add back glimepiride 2 mg in the morning only.  If blood sugars remain elevated after week, patient will increase glimepiride dose further to 4 mg daily but will keep it just in the morning to help limit risk for low blood sugar in the middle the night.  Patient will update MD in a few weeks if blood sugars remain elevated.  Otherwise check A1c in 3 months  - glimepiride (AMARYL) 4 MG tablet; Take 1/2-1 tab daily in AM for diabetes  Dispense: 30 tablet; Refill: 11  - metFORMIN (GLUCOPHAGE) 1000 MG tablet; 1 tablet by mouth in AM and 1 tablet in the PM  Dispense: 180 tablet; Refill: 3  - Hemoglobin A1c; Future      2. Hyperlipidemia LDL goal <100  Most recent lipids elevated.  Patient states she had been taking lovastatin.  Lipids previously well controlled with same dose.  Patient did have a small amount of butter jelly earlier this morning but otherwise has not eaten over the past 5 hours.  Therefore recheck lipids today.  - Lipid panel reflex to direct LDL Fasting; Future  - Lipid panel reflex to direct LDL Fasting    3. Stage 3a chronic kidney disease  On diuretic therapy.  Previous renal function worsened some possibly related to dehydration with diuretic use.  Labs today as ordered.  If renal function remains worsened, will back down on diuretic dosing  - Basic metabolic panel; Future  - Basic metabolic panel      PLAN:  Continue current medications for now   Start Glimepiirde 4mg tablet, 1/2 tablet daily in the AM for diabetes and blood sugars.   If blood sugars still > 130 in AM and > 180 in the PM  after 1 week, then increase to 1 tab Glimepiride (4mg) in the AM  Check sugars in AM of odd days before breakfast and before bedtime on even days so still once a day  If blood sugars still high despite adding  Glimepiride 4mg tab, then contact clinic   Labs as ordered   Repeat nonfasting A1C lab in late November  Schedule an eye appointment  I would recommend you receive an influenza (flu) vaccine  this Fall (October or early November)  Probable covid vaccine booster shot 8 months after your previous vaccine series. Will await further guidance from the CDC in September      (Chart documentation was completed, in part, with [employer] voice-recognition software. Even though reviewed, some grammatical, spelling, and word errors may remain.)    Venkatesh Cardenas MD  Internal Medicine Department  [facility]      Vera Pulido is a 86 year old who presents for the following health issues     HPI     Diabetes Follow-up    How often are you checking your blood sugar? Two times daily  Blood sugar testing frequency justification:  High Blood Sugar  What time of day are you checking your blood sugars (select all that apply)?  After Meals and Before and at times at 3pm  Have you had any blood sugars above 200?  Yes 200's  Have you had any blood sugars below 70?  No    What symptoms do you notice when your blood sugar is low?  None    What concerns do you have today about your diabetes? Other: High Blood Sugars     Do you have any of these symptoms? (Select all that apply)  No numbness or tingling in feet.  No redness, sores or blisters on feet.  No complaints of excessive thirst.  No reports of blurry vision.  No significant changes to weight.    Have you had a diabetic eye exam in the last 12 months? No    BP Readings from Last 2 Encounters:   08/24/21 126/78   06/29/21 122/70     Hemoglobin A1C (%)   Date Value   06/29/2021 7.2 (H)   08/27/2020 6.6 (H)     LDL Cholesterol Calculated (mg/dL)    Date Value   06/29/2021 129 (H)   08/27/2020 57                  Most recent lab results reviewed with pt.      Component      Latest Ref Rng & Units 4/30/2019 8/27/2020 6/29/2021   Sodium      133 - 144 mmol/L 137 144 140   Potassium      3.4 - 5.3 mmol/L 4.4 4.0 4.4   Chloride      94 - 109 mmol/L 105 112 (H) 109   Carbon Dioxide      20 - 32 mmol/L 25 25 26   Anion Gap      3 - 14 mmol/L 7 7 5   Glucose      70 - 99 mg/dL 135 (H) 64 (L) 174 (H)   Urea Nitrogen      7 - 30 mg/dL 34 (H) 28 35 (H)   Creatinine      0.52 - 1.04 mg/dL 1.04 0.95 1.11 (H)   GFR Estimate      >60 mL/min/1.73:m2 49 (L) 55 (L) 45 (L)   GFR Estimate If Black      >60 mL/min/1.73:m2 57 (L) 63 52 (L)   Calcium      8.5 - 10.1 mg/dL 9.7 10.2 (H) 9.4   Bilirubin Total      0.2 - 1.3 mg/dL 0.3 0.4 0.4   Albumin      3.4 - 5.0 g/dL 3.4 3.6 3.8   Protein Total      6.8 - 8.8 g/dL 6.7 (L) 6.9 7.4   Alkaline Phosphatase      40 - 150 U/L 80 70 86   ALT      0 - 50 U/L 31 24 31   AST      0 - 45 U/L 19 13 17   Cholesterol      <200 mg/dL 146 151 225 (H)   Triglycerides      <150 mg/dL 133 120 151 (H)   HDL Cholesterol      >49 mg/dL 56 70 66   LDL Cholesterol Calculated      <100 mg/dL 63 57 129 (H)   Non HDL Cholesterol      <130 mg/dL 90 81 159 (H)   Hemoglobin A1C      0 - 5.6 % 7.5 (H) 6.6 (H) 7.2 (H)       Denies CP, SOB, abdominal pain, polyuria, polydipsia, vision changes, extremity numbness/parasthesias or skin problems.   Blood sugars higher recently into the 200s. On metformin and patient recently increased dose from 500 mg a.m./1000 mg p.m. to 1000mg BID but sugars remain elevated.  Patient has Meals on Wheels daily.  Diet is not changed recently.  In March 2021, patient was having low blood sugars in the middle the night and glimepiride 2 mg  BID dose was discontinued.  Blood sugars at that time in the night at 3 am :85,74,60,55,74,74,79.  Due for eye exam  On diuretic therapy taking furosemide 3 times a week.  Weight down 7 pounds and  "now back to baseline.  Denies orthopnea, PND.  Mild edema at the ankle stable.       Additional ROS:   Constitutional, HEENT, Cardiovascular, Pulmonary, GI and , Neuro, MSK and Psych review of systems/symptoms are otherwise negative or unchanged from previous, except as noted above.      OBJECTIVE:  /78   Pulse 88   Temp 98.4  F (36.9  C) (Temporal)   Resp 16   Ht 1.448 m (4' 9\")   Wt 53.1 kg (117 lb)   LMP 10/03/1969   SpO2 98%   BMI 25.32 kg/m     Estimated body mass index is 25.32 kg/m  as calculated from the following:    Height as of this encounter: 1.448 m (4' 9\").    Weight as of this encounter: 53.1 kg (117 lb).     Neck: no adenopathy. Thyroid normal to palpation. No bruits  Pulm: Lungs clear to auscultation   CV: Regular rates and rhythm  GI: Soft, nontender, Normal active bowel sounds, No hepatosplenomegaly or masses palpable  Ext: Peripheral pulses intact. Minimal bilateral ankle edema.  Neuro: Normal strength and tone, sensory exam mild reduced light touch sensation distal bilateral lower extremity     "

## 2021-08-25 LAB
ANION GAP SERPL CALCULATED.3IONS-SCNC: 4 MMOL/L (ref 3–14)
BUN SERPL-MCNC: 35 MG/DL (ref 7–30)
CALCIUM SERPL-MCNC: 10 MG/DL (ref 8.5–10.1)
CHLORIDE BLD-SCNC: 109 MMOL/L (ref 94–109)
CHOLEST SERPL-MCNC: 240 MG/DL
CO2 SERPL-SCNC: 27 MMOL/L (ref 20–32)
CREAT SERPL-MCNC: 1.14 MG/DL (ref 0.52–1.04)
FASTING STATUS PATIENT QL REPORTED: NO
GFR SERPL CREATININE-BSD FRML MDRD: 44 ML/MIN/1.73M2
GLUCOSE BLD-MCNC: 164 MG/DL (ref 70–99)
HDLC SERPL-MCNC: 58 MG/DL
LDLC SERPL CALC-MCNC: 141 MG/DL
NONHDLC SERPL-MCNC: 182 MG/DL
POTASSIUM BLD-SCNC: 4.4 MMOL/L (ref 3.4–5.3)
SODIUM SERPL-SCNC: 140 MMOL/L (ref 133–144)
TRIGL SERPL-MCNC: 206 MG/DL

## 2021-09-01 ENCOUNTER — TELEPHONE (OUTPATIENT)
Dept: INTERNAL MEDICINE | Facility: CLINIC | Age: 86
End: 2021-09-01

## 2021-09-01 NOTE — TELEPHONE ENCOUNTER
Dr. Cardenas    Pharmacy called, patient went to  her script and told the pharmacist she has been taking the Glimepiride 16 mg ( 4 tablets ) daily. ( she picked this up on August 24th. The pharmacist spent time with the patient on education about the right dosage. The pharmacist will get a override to get the script filled.     Patient called by this writer:   Blood sugars have been the range 100-124. She said she's feels fine ( she actually feels really good , she didn't have any issues with her blood sugar being too low. Patient knows to only take 1/2-1 tablet daily- patient reports she got confused.     Should patient be on a possible higher dose being that her blood sugars were stable, patient is wondering ?       Can we leave a detailed message on this number? YES  Phone number patient can be reached at: Home number on file [phone] (home)    Vanna Chew RN  [facility] Triage          Vanna Chew RN

## 2021-09-02 ENCOUNTER — OFFICE VISIT (OUTPATIENT)
Dept: DERMATOLOGY | Facility: CLINIC | Age: 86
End: 2021-09-02
Attending: INTERNAL MEDICINE
Payer: COMMERCIAL

## 2021-09-02 VITALS — OXYGEN SATURATION: 97 % | HEART RATE: 88 BPM | SYSTOLIC BLOOD PRESSURE: 144 MMHG | DIASTOLIC BLOOD PRESSURE: 86 MMHG

## 2021-09-02 DIAGNOSIS — L81.4 LENTIGO: ICD-10-CM

## 2021-09-02 DIAGNOSIS — D18.01 ANGIOMA OF SKIN: ICD-10-CM

## 2021-09-02 DIAGNOSIS — L57.0 AK (ACTINIC KERATOSIS): Primary | ICD-10-CM

## 2021-09-02 DIAGNOSIS — L82.1 SEBORRHEIC KERATOSIS: ICD-10-CM

## 2021-09-02 DIAGNOSIS — L98.9 SKIN DISORDER: ICD-10-CM

## 2021-09-02 DIAGNOSIS — D23.9 DERMAL NEVUS: ICD-10-CM

## 2021-09-02 PROCEDURE — 99203 OFFICE O/P NEW LOW 30 MIN: CPT | Mod: 25 | Performed by: DERMATOLOGY

## 2021-09-02 PROCEDURE — 17000 DESTRUCT PREMALG LESION: CPT | Performed by: DERMATOLOGY

## 2021-09-02 PROCEDURE — 17003 DESTRUCT PREMALG LES 2-14: CPT | Performed by: DERMATOLOGY

## 2021-09-02 NOTE — LETTER
9/2/2021         RE: Tess Sellers  [address] [address] [facility] [place] [address]        Dear Colleague,    Thank you for referring your patient, Tess Sellers, to the [facility]. Please see a copy of my visit note below.    Tess Sellers , a 86 year old year old female patient, I was asked to see by Dr. Cardenas for scaly spots on nose.  Patient states this has been present for a while.  Patient reports the following symptoms:  scale .  Patient reports the following previous treatments none.  Patient reports the following modifying factors none.  Associated symptoms: none.  Patient has no other skin complaints today.  Remainder of the HPI, Meds, PMH, Allergies, FH, and SH was reviewed in chart.      Past Medical History:   Diagnosis Date     CKD (chronic kidney disease) stage 3, GFR 30-59 ml/min 11/20/2019     Colon polyps 2012    partial colon resection     Diaphragmatic hernia without mention of obstruction or gangrene     umbilical     DYSPEPSIA      Edema 10/29/2014     Esophageal reflux      Essential hypertension, benign      Hyperlipidemia LDL goal <100 2/21/2005     Hypertrophy of breast     reduction     idiopathic cyclic edema      Mixed incontinence      Osteoporosis, unspecified      Peripheral neuropathy      Spinal stenosis, lumbar region, without neurogenic claudication      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      Type 2 diabetes mellitus with diabetic polyneuropathy (goal A1C<8) 10/25/2015    oral       Past Surgical History:   Procedure Laterality Date     DAVINCI SACROCOLPOPEXY, CYSTOSCOPY, COMBINED  1/7/2014    Procedure: COMBINED DAVINCI SACROCOLPOPEXY, CYSTOSCOPY;  DAVINCI SACROCOLPOPEXY, Cystoscopy;  Surgeon: Antoine Painting MD;  Location: [facility] OR     HERNIORRHAPHY INCISIONAL (LOCATION) N/A 1/8/2015    Procedure: HERNIORRHAPHY INCISIONAL (LOCATION);  Surgeon: Petr Brink MD;  Location: [facility] OR     HYSTERECTOMY, INGA      age 34 for bleeding     ZZC NONSPECIFIC  PROCEDURE      status - post hysterectomy     ZZC NONSPECIFIC PROCEDURE      appendectomy     ZZC NONSPECIFIC PROCEDURE      cholecystectomy     ZZC NONSPECIFIC PROCEDURE      colon polyps     ZZC NONSPECIFIC PROCEDURE      bilateral breast reduction     ZZC NONSPECIFIC PROCEDURE  2006    right colectomy     ZZC NONSPECIFIC PROCEDURE  2008    hemorrhoidectomy     ZZC NONSPECIFIC PROCEDURE  2008    iol os     ZZC NONSPECIFIC PROCEDURE  2008    iol od     ZZC NONSPECIFIC PROCEDURE  2011    blepharoplasty ou     ZZC NONSPECIFIC PROCEDURE      laser os        Family History   Problem Relation Age of Onset     Family History Negative Mother         d:age 95     Cancer Father         d: age 90     Cancer - colorectal Father      Family History Negative Brother         d:age 61 influenza     Heart Disease Brother         d:age 61     Cancer Sister         d:age 42     Cancer Brother         b:1929 lymp cancer and lungs     Family History Negative Brother         b:1937       Social History     Socioeconomic History     Marital status: Single     Spouse name: Not on file     Number of children: Not on file     Years of education: 12     Highest education level: Not on file   Occupational History     Occupation: retired   Tobacco Use     Smoking status: Never Smoker     Smokeless tobacco: Never Used   Substance and Sexual Activity     Alcohol use: No     Drug use: No     Sexual activity: Never   Other Topics Concern     Parent/sibling w/ CABG, MI or angioplasty before 65F 55M? Not Asked   Social History Narrative     Not on file     Social Determinants of Health     Financial Resource Strain:      Difficulty of Paying Living Expenses:    Food Insecurity:      Worried About Running Out of Food in the Last Year:      Ran Out of Food in the Last Year:    Transportation Needs:      Lack of Transportation (Medical):      Lack of Transportation (Non-Medical):    Physical Activity:      Days of Exercise per Week:      Minutes of  Exercise per Session:    Stress:      Feeling of Stress :    Social Connections:      Frequency of Communication with Friends and Family:      Frequency of Social Gatherings with Friends and Family:      Attends [employer] Services:      Active Member of Clubs or Organizations:      Attends Club or Organization Meetings:      Marital Status:    Intimate Partner Violence:      Fear of Current or Ex-Partner:      Emotionally Abused:      Physically Abused:      Sexually Abused:        Outpatient Encounter Medications as of 9/2/2021   Medication Sig Dispense Refill     acetaminophen (TYLENOL) 325 MG tablet Take 2 tablets (650 mg) by mouth every 4 hours as needed for other (mild pain) 100 tablet 0     ASPIRIN NOT PRESCRIBED (INTENTIONAL) due to dyspepsia, reflux 0 0     blood glucose (ONETOUCH ULTRA) test strip Use to check blood sugar twice a day 200 strip 3     furosemide (LASIX) 20 MG tablet TAKE ONE TABLET BY MOUTH THREE TIMES PER WEEK.  (MONDAY, WEDNESDAY, AND FRIDAY.) 36 tablet 0     gabapentin (NEURONTIN) 100 MG capsule TAKE 1-3 CAPSULES BY MOUTH AT BEDTIME 90 capsule 11     glimepiride (AMARYL) 4 MG tablet Take 1/2-1 tab daily in AM for diabetes 30 tablet 11     losartan (COZAAR) 100 MG tablet Take 1 tablet (100 mg) by mouth daily 90 tablet 3     lovastatin (MEVACOR) 40 MG tablet TAKE 1 TABLET (40MG) BY MOUTH AT BEDTIME 90 tablet 3     metFORMIN (GLUCOPHAGE) 1000 MG tablet 1 tablet by mouth in AM and 1 tablet in the  tablet 3     MULTI-VITAMIN OR TABS 1 TABLET DAILY 0 0     polyethylene glycol (MIRALAX) 17 GM/Dose powder Take 17 g (1 capful) by mouth 2 times daily 507 g 1     VITAMIN B-6 100 MG OR TABS 1 x daily 0 0     No facility-administered encounter medications on file as of 9/2/2021.             Review Of Systems  Skin: As above  Eyes: negative  Ears/Nose/Throat: negative  Respiratory: No shortness of breath, dyspnea on exertion, cough, or hemoptysis  Cardiovascular: negative  Gastrointestinal:  negative  Genitourinary: negative  Musculoskeletal: negative  Neurologic: negative  Psychiatric: negative  Hematologic/Lymphatic/Immunologic: negative  Endocrine: negative      O:   NAD, WDWN, Alert & Oriented, Mood & Affect wnl, Vitals stable   Here today alone   BP (!) 144/86   Pulse 88   LMP 10/03/1969   SpO2 97%    General appearance amena ii   Vitals stable   Alert, oriented and in no acute distress      Following lymph nodes palpated: Occipital, Cervical, Supraclavicular no lad   Gritty papules on nose x2   Stuck on papules and brown macules on trunk and ext    Red papules on face   Flesh colored papules on face     The remainder of expanded problem focused exam was normal; the following areas were examined:  scalp/hair, conjunctiva/lids, face, neck, lips, chest, digits/nails, RUE, LUE.      Eyes: Conjunctivae/lids:Normal     ENT: Lips, buccal mucosa, tongue: normal    MSK:Normal    Cardiovascular: peripheral edema none    Pulm: Breathing Normal    Lymph Nodes: No Head and Neck Lymphadenopathy     Neuro/Psych: Orientation:Normal; Mood/Affect:Normal      A/P:  1. Seborrheic keratosis, lentigo, angioma, dermal nevus  2. Actinic keratosis nose x2  LN2:  Treated with LN2 for 5s for 1-2 cycles. Warned risks of blistering, pain, pigment change, scarring, and incomplete resolution.  Advised patient to return if lesions do not completely resolve.  Wound care sheet given.  Tess to follow up with Primary Care provider regarding elevated blood pressure.    It was a pleasure speaking to Tess Sellers today.  Previous clinic  notes and pertinent laboratory tests were reviewed prior to Tess Sellers's visit.    Nature and genetics of benign skin lesions dicussed with patient.  Signs and Symptoms of skin cancer discussed with patient.  Patient encouraged to perform monthly skin exams.  UV precautions reviewed with patient.  Risks of non-melanoma skin cancer discussed with patient   Return to clinic 3  months        Again, thank you for allowing me to participate in the care of your patient.        Sincerely,        Walt Pathak MD

## 2021-09-02 NOTE — PROGRESS NOTES
Tess Sellers , a 86 year old year old female patient, I was asked to see by Dr. Cardenas for scaly spots on nose.  Patient states this has been present for a while.  Patient reports the following symptoms:  scale .  Patient reports the following previous treatments none.  Patient reports the following modifying factors none.  Associated symptoms: none.  Patient has no other skin complaints today.  Remainder of the HPI, Meds, PMH, Allergies, FH, and SH was reviewed in chart.      Past Medical History:   Diagnosis Date     CKD (chronic kidney disease) stage 3, GFR 30-59 ml/min 11/20/2019     Colon polyps 2012    partial colon resection     Diaphragmatic hernia without mention of obstruction or gangrene     umbilical     DYSPEPSIA      Edema 10/29/2014     Esophageal reflux      Essential hypertension, benign      Hyperlipidemia LDL goal <100 2/21/2005     Hypertrophy of breast     reduction     idiopathic cyclic edema      Mixed incontinence      Osteoporosis, unspecified      Peripheral neuropathy      Spinal stenosis, lumbar region, without neurogenic claudication      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      Type 2 diabetes mellitus with diabetic polyneuropathy (goal A1C<8) 10/25/2015    oral       Past Surgical History:   Procedure Laterality Date     DAVINCI SACROCOLPOPEXY, CYSTOSCOPY, COMBINED  1/7/2014    Procedure: COMBINED DAVINCI SACROCOLPOPEXY, CYSTOSCOPY;  DAVINCI SACROCOLPOPEXY, Cystoscopy;  Surgeon: Antoine Painting MD;  Location: [facility] OR     HERNIORRHAPHY INCISIONAL (LOCATION) N/A 1/8/2015    Procedure: HERNIORRHAPHY INCISIONAL (LOCATION);  Surgeon: Petr Brink MD;  Location: [facility] OR     HYSTERECTOMY, INGA      age 34 for bleeding     ZZC NONSPECIFIC PROCEDURE      status - post hysterectomy     ZZC NONSPECIFIC PROCEDURE      appendectomy     ZZC NONSPECIFIC PROCEDURE      cholecystectomy     ZZC NONSPECIFIC PROCEDURE      colon polyps     ZZC NONSPECIFIC PROCEDURE      bilateral breast reduction     ZZC  NONSPECIFIC PROCEDURE  2006    right colectomy     ZZC NONSPECIFIC PROCEDURE  2008    hemorrhoidectomy     ZZC NONSPECIFIC PROCEDURE  2008    iol os     ZZC NONSPECIFIC PROCEDURE  2008    iol od     ZZC NONSPECIFIC PROCEDURE  2011    blepharoplasty ou     ZZC NONSPECIFIC PROCEDURE      laser os        Family History   Problem Relation Age of Onset     Family History Negative Mother         d:age 95     Cancer Father         d: age 90     Cancer - colorectal Father      Family History Negative Brother         d:age 61 influenza     Heart Disease Brother         d:age 61     Cancer Sister         d:age 42     Cancer Brother         b:1929 lymp cancer and lungs     Family History Negative Brother         b:1937       Social History     Socioeconomic History     Marital status: Single     Spouse name: Not on file     Number of children: Not on file     Years of education: 12     Highest education level: Not on file   Occupational History     Occupation: retired   Tobacco Use     Smoking status: Never Smoker     Smokeless tobacco: Never Used   Substance and Sexual Activity     Alcohol use: No     Drug use: No     Sexual activity: Never   Other Topics Concern     Parent/sibling w/ CABG, MI or angioplasty before 65F 55M? Not Asked   Social History Narrative     Not on file     Social Determinants of Health     Financial Resource Strain:      Difficulty of Paying Living Expenses:    Food Insecurity:      Worried About Running Out of Food in the Last Year:      Ran Out of Food in the Last Year:    Transportation Needs:      Lack of Transportation (Medical):      Lack of Transportation (Non-Medical):    Physical Activity:      Days of Exercise per Week:      Minutes of Exercise per Session:    Stress:      Feeling of Stress :    Social Connections:      Frequency of Communication with Friends and Family:      Frequency of Social Gatherings with Friends and Family:      Attends Scientologist Services:      Active Member of Clubs  or Organizations:      Attends Club or Organization Meetings:      Marital Status:    Intimate Partner Violence:      Fear of Current or Ex-Partner:      Emotionally Abused:      Physically Abused:      Sexually Abused:        Outpatient Encounter Medications as of 9/2/2021   Medication Sig Dispense Refill     acetaminophen (TYLENOL) 325 MG tablet Take 2 tablets (650 mg) by mouth every 4 hours as needed for other (mild pain) 100 tablet 0     ASPIRIN NOT PRESCRIBED (INTENTIONAL) due to dyspepsia, reflux 0 0     blood glucose (ONETOUCH ULTRA) test strip Use to check blood sugar twice a day 200 strip 3     furosemide (LASIX) 20 MG tablet TAKE ONE TABLET BY MOUTH THREE TIMES PER WEEK.  (MONDAY, WEDNESDAY, AND FRIDAY.) 36 tablet 0     gabapentin (NEURONTIN) 100 MG capsule TAKE 1-3 CAPSULES BY MOUTH AT BEDTIME 90 capsule 11     glimepiride (AMARYL) 4 MG tablet Take 1/2-1 tab daily in AM for diabetes 30 tablet 11     losartan (COZAAR) 100 MG tablet Take 1 tablet (100 mg) by mouth daily 90 tablet 3     lovastatin (MEVACOR) 40 MG tablet TAKE 1 TABLET (40MG) BY MOUTH AT BEDTIME 90 tablet 3     metFORMIN (GLUCOPHAGE) 1000 MG tablet 1 tablet by mouth in AM and 1 tablet in the  tablet 3     MULTI-VITAMIN OR TABS 1 TABLET DAILY 0 0     polyethylene glycol (MIRALAX) 17 GM/Dose powder Take 17 g (1 capful) by mouth 2 times daily 507 g 1     VITAMIN B-6 100 MG OR TABS 1 x daily 0 0     No facility-administered encounter medications on file as of 9/2/2021.             Review Of Systems  Skin: As above  Eyes: negative  Ears/Nose/Throat: negative  Respiratory: No shortness of breath, dyspnea on exertion, cough, or hemoptysis  Cardiovascular: negative  Gastrointestinal: negative  Genitourinary: negative  Musculoskeletal: negative  Neurologic: negative  Psychiatric: negative  Hematologic/Lymphatic/Immunologic: negative  Endocrine: negative      O:   NAD, WDWN, Alert & Oriented, Mood & Affect wnl, Vitals stable   Here today  alone   BP (!) 144/86   Pulse 88   LMP 10/03/1969   SpO2 97%    General appearance amena ii   Vitals stable   Alert, oriented and in no acute distress      Following lymph nodes palpated: Occipital, Cervical, Supraclavicular no lad   Gritty papules on nose x2   Stuck on papules and brown macules on trunk and ext    Red papules on face   Flesh colored papules on face     The remainder of expanded problem focused exam was normal; the following areas were examined:  scalp/hair, conjunctiva/lids, face, neck, lips, chest, digits/nails, RUE, LUE.      Eyes: Conjunctivae/lids:Normal     ENT: Lips, buccal mucosa, tongue: normal    MSK:Normal    Cardiovascular: peripheral edema none    Pulm: Breathing Normal    Lymph Nodes: No Head and Neck Lymphadenopathy     Neuro/Psych: Orientation:Normal; Mood/Affect:Normal      A/P:  1. Seborrheic keratosis, lentigo, angioma, dermal nevus  2. Actinic keratosis nose x2  LN2:  Treated with LN2 for 5s for 1-2 cycles. Warned risks of blistering, pain, pigment change, scarring, and incomplete resolution.  Advised patient to return if lesions do not completely resolve.  Wound care sheet given.  Tess to follow up with Primary Care provider regarding elevated blood pressure.    It was a pleasure speaking to Tess Sellers today.  Previous clinic  notes and pertinent laboratory tests were reviewed prior to Tess Sellers's visit.    Nature and genetics of benign skin lesions dicussed with patient.  Signs and Symptoms of skin cancer discussed with patient.  Patient encouraged to perform monthly skin exams.  UV precautions reviewed with patient.  Risks of non-melanoma skin cancer discussed with patient   Return to clinic 3 months

## 2021-09-02 NOTE — NURSING NOTE
"Initial BP (!) 144/86   Pulse 88   LMP 10/03/1969   SpO2 97%  Estimated body mass index is 25.32 kg/m  as calculated from the following:    Height as of 8/24/21: 1.448 m (4' 9\").    Weight as of 8/24/21: 53.1 kg (117 lb).     PHOEBE March-BSN-N  [facility]  [phone]  "

## 2021-10-13 DIAGNOSIS — N18.31 STAGE 3A CHRONIC KIDNEY DISEASE (H): Primary | ICD-10-CM

## 2021-10-13 NOTE — TELEPHONE ENCOUNTER
Tried to call patient both last night and tonight.  No answer.  Left message that patient to be taking glimepiride 4 mg tablet, 1/2 tablet once a day in the morning.  If blood sugars high still ( >130 in AM and > 180 in PM, then patient to take 1 full tablet daily.  Patient was instructed not to be taking glimepiride more than once a day.  Asked patient to call back to the clinic if she is taking the glimepiride different than these instructions.  Also states that pharmacist had instructed her regarding correct dosage as entered into the chart and also per instructions patient was given with most recent appointment 8/24/2021.

## 2021-10-15 ENCOUNTER — TELEPHONE (OUTPATIENT)
Dept: INTERNAL MEDICINE | Facility: CLINIC | Age: 86
End: 2021-10-15

## 2021-10-15 NOTE — TELEPHONE ENCOUNTER
Patient fell about 3 days ago, fell out of bed, injured big toe  Left big toes is black and blue, patient is a diabetic. Hurts to move toe  Slight pain with walking  Toe is swollen  Patient instructed needs to be seen today. Patient transferred to scheduling for appointment. Patient was also instructed if no available appointments to go to urgent care   Patient verbalized understanding      Lindsay SANTOSN, RN

## 2021-10-20 ENCOUNTER — OFFICE VISIT (OUTPATIENT)
Dept: INTERNAL MEDICINE | Facility: CLINIC | Age: 86
End: 2021-10-20
Payer: COMMERCIAL

## 2021-10-20 ENCOUNTER — ANCILLARY PROCEDURE (OUTPATIENT)
Dept: GENERAL RADIOLOGY | Facility: CLINIC | Age: 86
End: 2021-10-20
Attending: PHYSICIAN ASSISTANT
Payer: COMMERCIAL

## 2021-10-20 VITALS
SYSTOLIC BLOOD PRESSURE: 122 MMHG | WEIGHT: 118.4 LBS | HEART RATE: 79 BPM | TEMPERATURE: 98.2 F | OXYGEN SATURATION: 97 % | DIASTOLIC BLOOD PRESSURE: 68 MMHG | BODY MASS INDEX: 25.62 KG/M2

## 2021-10-20 DIAGNOSIS — S99.922A INJURY OF TOE, LEFT, INITIAL ENCOUNTER: Primary | ICD-10-CM

## 2021-10-20 DIAGNOSIS — S92.426A: ICD-10-CM

## 2021-10-20 DIAGNOSIS — S99.922A INJURY OF TOE, LEFT, INITIAL ENCOUNTER: ICD-10-CM

## 2021-10-20 PROCEDURE — 99213 OFFICE O/P EST LOW 20 MIN: CPT | Performed by: PHYSICIAN ASSISTANT

## 2021-10-20 PROCEDURE — 73660 X-RAY EXAM OF TOE(S): CPT | Mod: LT | Performed by: RADIOLOGY

## 2021-10-20 NOTE — PROGRESS NOTES
Assessment & Plan     Injury of toe, left, initial encounter    - XR Toe Left G/E 2 Views; Future    Nondisplaced fracture of distal phalanx of unspecified great toe, initial encounter for closed fracture                 Patient Instructions   Icing,   Wear your shoe in the house as well for support of the toe.   Tylenol if needed for pain        Return in about 3 weeks (around 11/10/2021) for Lab Work.    Huma Burris PA-C  [facility] EAGLE Pulido is a 86 year old who presents for the following health issues     HPI     Musculoskeletal problem/pain  Onset/Duration:2 weeks  Description  Location: big toe - left  Joint Swelling: YES  Redness: no  No but bruising   Pain: YES  Warmth: no  Intensity:  mild  Progression of Symptoms:  same  Accompanying signs and symptoms:   Fevers: no  Numbness/tingling/weakness: no  History  Trauma to the area: YES- fell out of bed and stubbed her toe  Recent illness:  no  Previous similar problem: no  Previous evaluation:  no  Precipitating or alleviating factors:  Aggravating factors include: none  Therapies tried and outcome: nothing        Review of Systems   Constitutional, HEENT, cardiovascular, pulmonary, MS systems are negative, except as otherwise noted.      Objective    /68   Pulse 79   Temp 98.2  F (36.8  C) (Oral)   Wt 53.7 kg (118 lb 6.4 oz)   LMP 10/03/1969   SpO2 97%   BMI 25.62 kg/m    Body mass index is 25.62 kg/m .  Physical Exam   GENERAL: healthy, alert and no distress  RESP: lungs clear to auscultation - no rales, rhonchi or wheezes  CV: regular rates and rhythm and normal S1 S2, no S3 or S4  MS: distal left great toe with some bruising noted.   No swelling or redness  Mild pain on palpation   SKIN: no suspicious lesions or rashes    XR TOE LEFT G/E 2 VIEWS 10/20/2021 8:57 AM      HISTORY: Injury of great toe, left, initial encounter                                                                       IMPRESSION: Due to obliquity, irregularity of the great toe distal  phalangeal base could represent artifact. However, nondisplaced  fracture is not excluded. Hallux valgus and bunion. First metatarsal  phalangeal joint space widths is within normal limits.    \plain

## 2021-10-20 NOTE — PATIENT INSTRUCTIONS
Icing,   Wear your shoe in the house as well for support of the toe.   Tylenol if needed for pain

## 2021-10-26 NOTE — TELEPHONE ENCOUNTER
Prescription approved per [facility] Refill Protocol.     Bcc Micronodular Histology Text: Multiple small aggregates of basaloid cells are present within the dermis. These small nodules display variable subtle peripheral palisading and retraction artifact.

## 2021-12-01 DIAGNOSIS — R60.9 EDEMA, UNSPECIFIED TYPE: ICD-10-CM

## 2021-12-01 NOTE — LETTER
[facility]  [address] [address]  [phone]      12/10/2021       Tess Sellers  [address] [facility] [address]        Dear Tess,  While refilling your prescription today, we noticed that you are due for a follow up appointment and non-fasting labs with Dr. Cardenas. We will refill your prescription for a limited supply. Please call to schedule to a non-fasting lab appointment with in the next week.     Taking care of your health is important to us and we look forward to seeing you in the near future.  Please call us at [phone] or [phone] (or use [employer]) to schedule an appointment.     Please disregard this notice if you have already made an appointment.        Sincerely,      Venkatesh Cardenas MD/Huma Cervantes, [facility]  Internal Medicine

## 2021-12-02 RX ORDER — FUROSEMIDE 20 MG
TABLET ORAL
Qty: 36 TABLET | Refills: 0 | Status: SHIPPED | OUTPATIENT
Start: 2021-12-02 | End: 2022-03-17

## 2021-12-02 NOTE — TELEPHONE ENCOUNTER
Routing refill request to provider for review/approval because:  Labs out of range:    Creatinine   Date Value Ref Range Status   08/24/2021 1.14 (H) 0.52 - 1.04 mg/dL Final   06/29/2021 1.11 (H) 0.52 - 1.04 mg/dL Final       Tasia Demarco RN

## 2021-12-02 NOTE — TELEPHONE ENCOUNTER
Pt overdue for nonfasting labs with hx CKD and diuretic use with Furosemide. Was no show for lab appt in November. Call pt and get pt scheduled for nonfasting lab appt in the next 1 week

## 2021-12-07 NOTE — TELEPHONE ENCOUNTER
Huma Cervantes CMA contacted Tess on 12/07/21 and left a message. If patient calls back please schedule appointment [facility] with in the in week.

## 2021-12-30 ENCOUNTER — LAB (OUTPATIENT)
Dept: LAB | Facility: CLINIC | Age: 86
End: 2021-12-30
Payer: COMMERCIAL

## 2021-12-30 DIAGNOSIS — N18.31 STAGE 3A CHRONIC KIDNEY DISEASE (H): ICD-10-CM

## 2021-12-30 DIAGNOSIS — E11.42 TYPE 2 DIABETES MELLITUS WITH DIABETIC POLYNEUROPATHY, WITHOUT LONG-TERM CURRENT USE OF INSULIN (H): ICD-10-CM

## 2021-12-30 LAB
ANION GAP SERPL CALCULATED.3IONS-SCNC: 7 MMOL/L (ref 3–14)
BUN SERPL-MCNC: 35 MG/DL (ref 7–30)
CALCIUM SERPL-MCNC: 9.9 MG/DL (ref 8.5–10.1)
CHLORIDE BLD-SCNC: 110 MMOL/L (ref 94–109)
CO2 SERPL-SCNC: 24 MMOL/L (ref 20–32)
CREAT SERPL-MCNC: 1.02 MG/DL (ref 0.52–1.04)
ERYTHROCYTE [DISTWIDTH] IN BLOOD BY AUTOMATED COUNT: 13 % (ref 10–15)
GFR SERPL CREATININE-BSD FRML MDRD: 53 ML/MIN/1.73M2
GLUCOSE BLD-MCNC: 148 MG/DL (ref 70–99)
HBA1C MFR BLD: 6.5 % (ref 0–5.6)
HCT VFR BLD AUTO: 38.2 % (ref 35–47)
HGB BLD-MCNC: 12.3 G/DL (ref 11.7–15.7)
MCH RBC QN AUTO: 29.5 PG (ref 26.5–33)
MCHC RBC AUTO-ENTMCNC: 32.2 G/DL (ref 31.5–36.5)
MCV RBC AUTO: 92 FL (ref 78–100)
PHOSPHATE SERPL-MCNC: 3.6 MG/DL (ref 2.5–4.5)
PLATELET # BLD AUTO: 281 10E3/UL (ref 150–450)
POTASSIUM BLD-SCNC: 4.2 MMOL/L (ref 3.4–5.3)
PTH-INTACT SERPL-MCNC: 27 PG/ML (ref 18–80)
RBC # BLD AUTO: 4.17 10E6/UL (ref 3.8–5.2)
SODIUM SERPL-SCNC: 141 MMOL/L (ref 133–144)
WBC # BLD AUTO: 5 10E3/UL (ref 4–11)

## 2021-12-30 PROCEDURE — 85027 COMPLETE CBC AUTOMATED: CPT

## 2021-12-30 PROCEDURE — 80048 BASIC METABOLIC PNL TOTAL CA: CPT

## 2021-12-30 PROCEDURE — 84100 ASSAY OF PHOSPHORUS: CPT

## 2021-12-30 PROCEDURE — 83970 ASSAY OF PARATHORMONE: CPT

## 2021-12-30 PROCEDURE — 82306 VITAMIN D 25 HYDROXY: CPT

## 2021-12-30 PROCEDURE — 36415 COLL VENOUS BLD VENIPUNCTURE: CPT

## 2021-12-30 PROCEDURE — 83036 HEMOGLOBIN GLYCOSYLATED A1C: CPT

## 2021-12-31 LAB — DEPRECATED CALCIDIOL+CALCIFEROL SERPL-MC: 64 UG/L (ref 20–75)

## 2022-02-07 ENCOUNTER — VIRTUAL VISIT (OUTPATIENT)
Dept: INTERNAL MEDICINE | Facility: CLINIC | Age: 87
End: 2022-02-07
Payer: COMMERCIAL

## 2022-02-07 DIAGNOSIS — E11.42 TYPE 2 DIABETES MELLITUS WITH DIABETIC POLYNEUROPATHY, WITHOUT LONG-TERM CURRENT USE OF INSULIN (H): ICD-10-CM

## 2022-02-07 DIAGNOSIS — K59.00 CONSTIPATION, UNSPECIFIED CONSTIPATION TYPE: ICD-10-CM

## 2022-02-07 DIAGNOSIS — E78.5 HYPERLIPIDEMIA LDL GOAL <100: ICD-10-CM

## 2022-02-07 DIAGNOSIS — N18.31 STAGE 3A CHRONIC KIDNEY DISEASE (H): ICD-10-CM

## 2022-02-07 DIAGNOSIS — G47.00 INSOMNIA, UNSPECIFIED TYPE: ICD-10-CM

## 2022-02-07 PROCEDURE — 99442 PR PHYSICIAN TELEPHONE EVALUATION 11-20 MIN: CPT | Mod: 95 | Performed by: INTERNAL MEDICINE

## 2022-02-07 RX ORDER — AMOXICILLIN 250 MG
1 CAPSULE ORAL 2 TIMES DAILY PRN
Qty: 60 TABLET | Refills: 11 | Status: SHIPPED | OUTPATIENT
Start: 2022-02-07

## 2022-02-07 RX ORDER — TRAZODONE HYDROCHLORIDE 50 MG/1
TABLET, FILM COATED ORAL
Qty: 30 TABLET | Refills: 11 | Status: SHIPPED | OUTPATIENT
Start: 2022-02-07

## 2022-02-07 ASSESSMENT — PATIENT HEALTH QUESTIONNAIRE - PHQ9: SUM OF ALL RESPONSES TO PHQ QUESTIONS 1-9: 9

## 2022-02-07 NOTE — PROGRESS NOTES
Tess is a 86 year old who is being evaluated via a billable telephone visit.      What phone number would you like to be contacted at? [phone]  How would you like to obtain your AVS? Targazyme    ASSESSMENT:   1. Type 2 diabetes mellitus with diabetic polyneuropathy, without long-term current use of insulin (H)  Controlled.  Continue current medication.  Recent A1c 6.5.  Repeat labs early July 2022  - Hemoglobin A1c; Future  - TSH with free T4 reflex; Future    2. Stage 3a chronic kidney disease (H)  Improving with last GFR 53.  Repeat labs July 2022  - Comprehensive metabolic panel; Future  - Albumin Random Urine Quantitative with Creat Ratio; Future    3. Hyperlipidemia LDL goal <100  Intolerant to multiple statin trials.  Is tolerating lovastatin okay but lipids not at goal.  Declines trial of Zetia or Repatha.  Will continue working on diet and repeat labs in July with same medication.  Patient will get at least some CAD risk reduction with statin therapy irregardless of lipid results  - Comprehensive metabolic panel; Future  - Lipid panel reflex to direct LDL Fasting; Future    4. Constipation, unspecified constipation type  Chronic issue.  Already using MiraLAX.  Will add Senokot as for further softening/stimulant  - senna-docusate (SENOKOT-S/PERICOLACE) 8.6-50 MG tablet; Take 1 tablet by mouth 2 times daily as needed for constipation  Dispense: 60 tablet; Refill: 11  - TSH with free T4 reflex; Future    5. Insomnia, unspecified type  Chronic issue.  Mostly issue of waking up at night.  Other sleep okay.  We will try low-dose trazodone  - traZODone (DESYREL) 50 MG tablet; Take 1/2 - 1  Tab at bedtime for insomnia  Dispense: 30 tablet; Refill: 11      PLAN:  Trazodone 50 mg tablet, 1/2 to 1 tablet at bedtime as needed for insomnia  Senokot-S 1 tablet twice a day as needed for constipation.  Continue MiraLAX  Continue other medications and diet  Call  [phone] or use [email or web address] to schedule a future lab  appointment  fasting in Early July 2022   For fasting labs, please refrain from eating for 8 hours or more.   Drink 2 glasses of water before your lab appointment. It is fine to take your  oral medications on the morning of the lab test as usual  Eye exam June 2022 as scheduled  Follow-up with me a few days after the labs done in July. Check blood sugars twice a day (before breakfast and bedtime) for 4 days prior to the appointment with me, write them down and have them available to review with the appointment      Phone call contact time  Call Started at 4:40pm  Call Ended at 4:55pm    Total minutes: 15 min    (Chart documentation was completed, in part, with [employer] voice-recognition software. Even though reviewed, some grammatical, spelling, and word errors may remain.)    Venkatesh Cardenas MD  Internal Medicine Department  [facility] is [facility] 86 year old who presents for the following health issues  accompanied by her son, Venkatesh and son, Kevin    HPI   Chief Complaint   Patient presents with     Diabetes     Bowel Problems      Constipation       Diabetes Follow-up    How often are you checking your blood sugar? A few times a week. None recently  What time of day are you checking your blood sugars (select all that apply)?  After meals  Have you had any blood sugars above 200?  No  Have you had any blood sugars below 70?  Yes at times    What symptoms do you notice when your blood sugar is low?  Shaky    What concerns do you have today about your diabetes? Other: Results of A1c,       Do you have any of these symptoms? (Select all that apply)  No numbness or tingling in feet.  No redness, sores or blisters on feet.  No complaints of excessive thirst.  No reports of blurry vision.  No significant changes to weight.    Have you had a diabetic eye exam in the last 12 months? No        BP Readings from Last 2 Encounters:   10/20/21 122/68   09/02/21 (!) 144/86      Hemoglobin A1C POCT (%)   Date Value   06/29/2021 7.2 (H)   08/27/2020 6.6 (H)     Hemoglobin A1C (%)   Date Value   12/30/2021 6.5 (H)     LDL Cholesterol Calculated (mg/dL)   Date Value   08/24/2021 141 (H)   06/29/2021 129 (H)   08/27/2020 57         Most recent lab results reviewed with pt.      Component      Latest Ref Rng & Units 6/29/2021 8/24/2021 12/30/2021   Sodium      133 - 144 mmol/L 140 140 141   Potassium      3.4 - 5.3 mmol/L 4.4 4.4 4.2   Chloride      94 - 109 mmol/L 109 109 110 (H)   Carbon Dioxide      20 - 32 mmol/L 26 27 24   Anion Gap      3 - 14 mmol/L 5 4 7   Glucose      70 - 99 mg/dL 174 (H) 164 (H) 148 (H)   Urea Nitrogen      7 - 30 mg/dL 35 (H) 35 (H) 35 (H)   Creatinine      0.52 - 1.04 mg/dL 1.11 (H) 1.14 (H) 1.02   GFR Estimate      >60 mL/min/1.73m2 45 (L) 44 (L) 53 (L)   GFR Estimate If Black      >60 mL/min/1.73:m2 52 (L)     Calcium      8.5 - 10.1 mg/dL 9.4 10.0 9.9   Bilirubin Total      0.2 - 1.3 mg/dL 0.4     Albumin      3.4 - 5.0 g/dL 3.8     Protein Total      6.8 - 8.8 g/dL 7.4     Alkaline Phosphatase      40 - 150 U/L 86     ALT      0 - 50 U/L 31     AST      0 - 45 U/L 17     WBC      4.0 - 11.0 10e3/uL   5.0   RBC Count      3.80 - 5.20 10e6/uL   4.17   Hemoglobin      11.7 - 15.7 g/dL   12.3   Hematocrit      35.0 - 47.0 %   38.2   MCV      78 - 100 fL   92   MCH      26.5 - 33.0 pg   29.5   MCHC      31.5 - 36.5 g/dL   32.2   RDW      10.0 - 15.0 %   13.0   Platelet Count      150 - 450 10e3/uL   281   Cholesterol      <200 mg/dL 225 (H) 240 (H)    Triglycerides      <150 mg/dL 151 (H) 206 (H)    HDL Cholesterol      >=50 mg/dL 66 58    LDL Cholesterol Calculated      <=100 mg/dL 129 (H) 141 (H)    Non HDL Cholesterol      <130 mg/dL 159 (H) 182 (H)    Patient Fasting > 8hrs?        No    Hemoglobin A1C      0.0 - 5.6 % 7.2 (H)  6.5 (H)   Vitamin D Deficiency screening      20 - 75 ug/L   64   Parathyroid Hormone Intact      18 - 80 pg/mL   27   Phosphorus       2.5 - 4.5 mg/dL   3.6     Denies CP, SOB, abdominal pain, polyuria, polydipsia, vision changes, extremity numbness/parasthesias or skin problems.  Intermittent constipation issues.  Sometimes using prune juice but that will then cause some diarrhea.  Last bowel movement was yesterday.  Having bowel movements about every 2 to 3 days time.  Constipation is a chronic issue.  Saw [facility] January 2021 regarding this.  Last colonoscopy September 2016.  Denies seeing blood in her stools.  Trouble with some insomnia and waking up in the middle the night.  Usually able to fall asleep okay.  Goes to bed about 9 PM.  Denies caffeine use.  Not particular worrying about things at night  Not checking blood sugars recently.  Most recent A1c's are in good control at 6.5.  Has an eye exam scheduled for June 2022      Additional ROS:   Constitutional, HEENT, Cardiovascular, Pulmonary, GI and , Neuro, MSK and Psych review of systems/symptoms are otherwise negative or unchanged from previous, except as noted above.           Objective:  Any reported vitals from today were reviewed in chart. See nursing documentation for details    RESP: No cough, no audible wheezing, able to talk in full sentences  GEN: Normal affect. Normal though content/speech  Remainder of exam unable to be completed due to telephone visits

## 2022-03-17 DIAGNOSIS — R60.9 EDEMA, UNSPECIFIED TYPE: ICD-10-CM

## 2022-03-17 RX ORDER — FUROSEMIDE 20 MG
TABLET ORAL
Qty: 36 TABLET | Refills: 2 | Status: SHIPPED | OUTPATIENT
Start: 2022-03-17

## 2022-03-17 NOTE — TELEPHONE ENCOUNTER
Prescription approved per [facility] Refill Protocol.  Kurt Lucia RN  [facility] Triage Nurse

## 2022-06-11 DIAGNOSIS — I10 ESSENTIAL HYPERTENSION, BENIGN: ICD-10-CM

## 2022-06-11 DIAGNOSIS — E78.5 HYPERLIPIDEMIA LDL GOAL <100: ICD-10-CM

## 2022-06-11 NOTE — LETTER
[facility]  [address] [address]  [phone]            Tess Sellers  [address] [facility] [address]        June 29, 2022    Dear Tess,    While refilling your prescription today, we noticed that you are due for fasting labs. Please schedule a fasting lab appointment in the next 2 weeks. .  We will refill your prescription for 90 days, but a follow-up appointment must be made before any additional refills can be approved.     Taking care of your health is important to us and we look forward to seeing you in the near future.  Please call us at [phone] or [phone] (or use [employer]) to schedule an appointment.     Please disregard this notice if you have already made an appointment.    Sincerely,        [facility]

## 2022-06-13 RX ORDER — LOVASTATIN 40 MG
TABLET ORAL
Qty: 90 TABLET | Refills: 1 | Status: SHIPPED | OUTPATIENT
Start: 2022-06-13

## 2022-06-13 RX ORDER — LOSARTAN POTASSIUM 100 MG/1
TABLET ORAL
Qty: 90 TABLET | Refills: 1 | Status: SHIPPED | OUTPATIENT
Start: 2022-06-13 | End: 2022-12-24

## 2022-06-13 NOTE — TELEPHONE ENCOUNTER
Patient due for fasting labs.  Call patient and schedule fasting lab appointment in the next couple weeks as ordered

## 2022-07-28 DIAGNOSIS — E11.42 TYPE 2 DIABETES MELLITUS WITH DIABETIC POLYNEUROPATHY, WITHOUT LONG-TERM CURRENT USE OF INSULIN (H): ICD-10-CM

## 2022-07-28 RX ORDER — GLIMEPIRIDE 4 MG/1
TABLET ORAL
Qty: 90 TABLET | Refills: 0 | Status: SHIPPED | OUTPATIENT
Start: 2022-07-28

## 2022-07-28 NOTE — LETTER
[facility]  [address] [id number] [phone]            Tess Sellers  [address] [facility] [address]            Dear Tess,    While refilling your glimepiride (AMARYL) 4 MG tablet prescription today, we noticed that you are due to have labs drawn and see your provider. Please schedule a fastin lab appointment in the next couple of weeks and a follow-up appointment with Dr. Cardenas in clinic within the next 3 months. We will refill your prescription for 90 days, but a follow-up appointment must be made before any additional refills can be approved.     Taking care of your health is important to us and we look forward to seeing you in the near future.  Please call us at [phone] or [phone] (or use [employer]) to schedule an appointment.     Please disregard this notice if you have already made an appointment.    Sincerely,        [facility]

## 2022-08-17 NOTE — TELEPHONE ENCOUNTER
Letter sent   
Routing refill request to provider for review/approval because:  Hemoglobin A1C POCT   Date Value Ref Range Status   06/29/2021 7.2 (H) 0 - 5.6 % Final     Comment:     Normal <5.7% Prediabetes 5.7-6.4%  Diabetes 6.5% or higher - adopted from ADA   consensus guidelines.       Hemoglobin A1C   Date Value Ref Range Status   12/30/2021 6.5 (H) 0.0 - 5.6 % Final     Comment:     Normal <5.7%   Prediabetes 5.7-6.4%    Diabetes 6.5% or higher     Note: Adopted from ADA consensus guidelines.         Kurt Lucia RN  [facility] Triage Nurse   
Virtual visit 5 months ago.  Last seen in clinic by me 1 year ago.  Overdue for fasting labs.  Medication refilled for 90 days.  Call patient and schedule a fasting lab appointment in the next couple weeks and a follow-up with me in clinic in the next 3 months and in next available regular appointment slot  
Toney Perez

## 2022-11-26 NOTE — PATIENT INSTRUCTIONS
Trazodone 50 mg tablet, 1/2 to 1 tablet at bedtime as needed for insomnia  Senokot-S 1 tablet twice a day as needed for constipation.  Continue MiraLAX  Continue other medications and diet  Call  [phone] or use [employer] to schedule a future lab appointment  fasting in Early July 2022   For fasting labs, please refrain from eating for 8 hours or more.   Drink 2 glasses of water before your lab appointment. It is fine to take your  oral medications on the morning of the lab test as usual  Eye exam June 2022 as scheduled  Follow-up with me a few days after the labs done in July. Check blood sugars twice a day (before breakfast and bedtime) for 4 days prior to the appointment with me, write them down and have them available to review with the appointment

## 2022-12-21 DIAGNOSIS — I10 ESSENTIAL HYPERTENSION, BENIGN: ICD-10-CM

## 2022-12-21 NOTE — LETTER
[facility]  [address] [id number] [phone]            Tess Sellers  [address] [facility] [address]        December 28, 2022    Dear Tess,    While refilling your prescription today, we noticed that you are due to have labs drawn and see your provider.  We will refill your prescription for 30 days, but a follow-up appointment must be made before any additional refills can be approved.     Taking care of your health is important to us and we look forward to seeing you in the near future.  Please call us at [phone] or [phone] (or use [facility] [employer]) to schedule an appointment.     Please disregard this notice if you have already made an appointment.    Sincerely,        [facility]

## 2022-12-24 RX ORDER — LOSARTAN POTASSIUM 100 MG/1
TABLET ORAL
Qty: 90 TABLET | Refills: 0 | Status: SHIPPED | OUTPATIENT
Start: 2022-12-24

## 2022-12-24 NOTE — TELEPHONE ENCOUNTER
Last saw pt in clinic > 1 year ago. Also due for fasting blood and urine labs. Med refilled for 90 days. Call pt and assist in scheduling an appt with me in the next 2-3 weeks. OK to use same day or next day appt slot. Please also schedule a fasting blood and urine lab for the week prior to appt with me so that I can review lab results with pt when seen in clinic

## 2023-02-26 DIAGNOSIS — K59.00 CONSTIPATION, UNSPECIFIED CONSTIPATION TYPE: ICD-10-CM

## 2023-02-27 RX ORDER — SENNOSIDES AND DOCUSATE SODIUM 8.6; 5 MG/1; MG/1
TABLET ORAL
Qty: 60 TABLET | Refills: 11 | OUTPATIENT
Start: 2023-02-27

## 2023-02-27 NOTE — TELEPHONE ENCOUNTER
Please see note below from Dr. Cardenas and reach out to pt.    Gypsy MENDEZ RN  [facility]

## 2023-02-27 NOTE — TELEPHONE ENCOUNTER
Patient Contact    Attempt # 1    Was call answered?  No.  Left message on voicemail with information to call me back.    On call back: please assist pt in scheduling per provider recommendations below.     Kena Fontana RN

## 2023-02-27 NOTE — TELEPHONE ENCOUNTER
Patient last seen a year ago.  Letters have been sent in June and August and December 2022 instructing her of need for follow-up appointment but no appointments have been made.  Call patient and family and get an appointment scheduled with me in the next months time in clinic.  May use same-day/next day or [facility] appointment slots that are open.  After appointment scheduled, then route refill request back to me to address

## 2023-02-28 NOTE — TELEPHONE ENCOUNTER
Triage RN called and spoke to the patient's son, Venkatesh (no C2C on file). There is consent to talk to the patient's Daughter, Tati. Triage RN called and spoke to Tati. Tati states the patient has switched provider's and is now seeing Dr. Justin (either located in [place] or [place]). Triage RN encouraged Tati to reach out to Dr. Justin's office if the patient is needing refills of this medication. Tati expressed understanding and had no additional questions at this time.     Rebeca Garcia, RN

## 2023-05-01 NOTE — TELEPHONE ENCOUNTER
Pt calling in, she was seen at the ED on 10/5 for a bee sting on her upper back. She was prescribed prednisone, and she took 1 tablet, but it increased her blood surgars drastically. She says she called clinic and was told not to take prednisone due to large change in blood sugar. She says that the bee sting has gotten better. There is a pink colored rash sized 10 inches wide around the bee sting site. She says that is does itch. Recommeded that she try OTC oral benadryl and also a anti-itch hydrocortisone cream to the itchy rash.   If no improvement, please call clinic back and inform MD.  Also, 6 month check-up, and lab appts scheduled for end of Nov, and beginning of November.  Patient stated understanding, and agreed to plan of care.    
98